# Patient Record
Sex: FEMALE | Race: WHITE | Employment: FULL TIME | URBAN - METROPOLITAN AREA
[De-identification: names, ages, dates, MRNs, and addresses within clinical notes are randomized per-mention and may not be internally consistent; named-entity substitution may affect disease eponyms.]

---

## 2017-01-09 ENCOUNTER — OFFICE VISIT (OUTPATIENT)
Dept: PERINATAL CARE | Facility: HOSPITAL | Age: 37
End: 2017-01-09
Attending: OBSTETRICS & GYNECOLOGY
Payer: COMMERCIAL

## 2017-01-09 VITALS
BODY MASS INDEX: 26.06 KG/M2 | WEIGHT: 166 LBS | HEIGHT: 67 IN | SYSTOLIC BLOOD PRESSURE: 93 MMHG | DIASTOLIC BLOOD PRESSURE: 71 MMHG | HEART RATE: 96 BPM

## 2017-01-09 DIAGNOSIS — O09.522 AMA (ADVANCED MATERNAL AGE) MULTIGRAVIDA 35+, SECOND TRIMESTER: Primary | ICD-10-CM

## 2017-01-09 PROBLEM — O09.529 AMA (ADVANCED MATERNAL AGE) MULTIGRAVIDA 35+ (HCC): Status: ACTIVE | Noted: 2017-01-09

## 2017-01-09 PROBLEM — O09.529 AMA (ADVANCED MATERNAL AGE) MULTIGRAVIDA 35+: Status: ACTIVE | Noted: 2017-01-09

## 2017-01-09 PROCEDURE — 99242 OFF/OP CONSLTJ NEW/EST SF 20: CPT

## 2017-01-09 PROCEDURE — 76811 OB US DETAILED SNGL FETUS: CPT

## 2017-01-09 RX ORDER — CHOLECALCIFEROL (VITAMIN D3) 25 MCG
1 TABLET,CHEWABLE ORAL DAILY
COMMUNITY
End: 2017-11-20

## 2017-01-09 NOTE — PROGRESS NOTES
Indication: Maternal age (39 years). ____________________________________________________________________________  History: Age: 39 years. Maternal age at [facility]: 39 years.  : 3 Para: 1.  _______________________________________________________________ No minor markers for aneuploidy are seen. The limitations of ultrasound were discussed. The patient understands that ultrasound cannot rule out all structural and chromosomal abnormalities.     ______________________________________________________________ pregnancy outcomes in women of advanced reproductive age. However, these women are at increased risk of infertility and pregnancy complications.   They are more likely to conceive with the assistance of assisted reproductive technology  and have a multiple as 28 percent in women over age 48. The incidence of gestational diabetes in the general obstetric population is 3 percent, rising to 7 to 15 percent in women over age 36 and 21 percent in women over age 48.   Women 28years of age or older are more likel anomaly is noted on ultrasound or Cell Free Fetal DNA testing is positive, amniocentesis or CVS should be recommended. Her prenatal records indicated she had a low risk Panorama screen. We reviewed cell free DNA screening and the limitations.   She is

## 2017-03-01 ENCOUNTER — OFFICE VISIT (OUTPATIENT)
Dept: FAMILY MEDICINE CLINIC | Facility: CLINIC | Age: 37
End: 2017-03-01

## 2017-03-01 VITALS
BODY MASS INDEX: 27 KG/M2 | HEART RATE: 99 BPM | RESPIRATION RATE: 16 BRPM | DIASTOLIC BLOOD PRESSURE: 72 MMHG | SYSTOLIC BLOOD PRESSURE: 116 MMHG | OXYGEN SATURATION: 98 % | TEMPERATURE: 98 F | WEIGHT: 174 LBS

## 2017-03-01 DIAGNOSIS — H11.31 SUBCONJUNCTIVAL HEMORRHAGE, RIGHT: ICD-10-CM

## 2017-03-01 DIAGNOSIS — Z13.0 SCREENING FOR ENDOCRINE, METABOLIC AND IMMUNITY DISORDER: ICD-10-CM

## 2017-03-01 DIAGNOSIS — Z13.29 SCREENING FOR ENDOCRINE, METABOLIC AND IMMUNITY DISORDER: ICD-10-CM

## 2017-03-01 DIAGNOSIS — H10.33 ACUTE BACTERIAL CONJUNCTIVITIS OF BOTH EYES: Primary | ICD-10-CM

## 2017-03-01 DIAGNOSIS — Z13.228 SCREENING FOR ENDOCRINE, METABOLIC AND IMMUNITY DISORDER: ICD-10-CM

## 2017-03-01 PROCEDURE — 99213 OFFICE O/P EST LOW 20 MIN: CPT | Performed by: PHYSICIAN ASSISTANT

## 2017-03-01 RX ORDER — TOBRAMYCIN 3 MG/ML
1 SOLUTION/ DROPS OPHTHALMIC EVERY 6 HOURS
Qty: 1 BOTTLE | Refills: 0 | Status: SHIPPED | OUTPATIENT
Start: 2017-03-01 | End: 2017-03-06

## 2017-03-06 ENCOUNTER — LAB ENCOUNTER (OUTPATIENT)
Dept: LAB | Age: 37
End: 2017-03-06
Attending: PHYSICIAN ASSISTANT
Payer: COMMERCIAL

## 2017-03-06 DIAGNOSIS — Z13.29 SCREENING FOR ENDOCRINE, METABOLIC AND IMMUNITY DISORDER: ICD-10-CM

## 2017-03-06 DIAGNOSIS — Z13.0 SCREENING FOR ENDOCRINE, METABOLIC AND IMMUNITY DISORDER: ICD-10-CM

## 2017-03-06 DIAGNOSIS — Z13.228 SCREENING FOR ENDOCRINE, METABOLIC AND IMMUNITY DISORDER: ICD-10-CM

## 2017-03-06 LAB
25-HYDROXYVITAMIN D (TOTAL): 33.6 NG/ML (ref 30–100)
ALBUMIN SERPL-MCNC: 2.6 G/DL (ref 3.5–4.8)
ALP LIVER SERPL-CCNC: 96 U/L (ref 37–98)
ALT SERPL-CCNC: 15 U/L (ref 14–54)
AST SERPL-CCNC: 17 U/L (ref 15–41)
BASOPHILS # BLD AUTO: 0.02 X10(3) UL (ref 0–0.1)
BASOPHILS NFR BLD AUTO: 0.3 %
BILIRUB SERPL-MCNC: 0.3 MG/DL (ref 0.1–2)
BUN BLD-MCNC: 8 MG/DL (ref 8–20)
CALCIUM BLD-MCNC: 8.4 MG/DL (ref 8.3–10.3)
CHLORIDE: 108 MMOL/L (ref 101–111)
CHOLEST SMN-MCNC: 282 MG/DL (ref ?–200)
CO2: 23 MMOL/L (ref 22–32)
CREAT BLD-MCNC: 0.62 MG/DL (ref 0.55–1.02)
EOSINOPHIL # BLD AUTO: 0.11 X10(3) UL (ref 0–0.3)
EOSINOPHIL NFR BLD AUTO: 1.7 %
ERYTHROCYTE [DISTWIDTH] IN BLOOD BY AUTOMATED COUNT: 12.9 % (ref 11.5–16)
GLUCOSE BLD-MCNC: 80 MG/DL (ref 70–99)
HCT VFR BLD AUTO: 38.6 % (ref 34–50)
HDLC SERPL-MCNC: 89 MG/DL (ref 45–?)
HDLC SERPL: 3.17 {RATIO} (ref ?–4.44)
HGB BLD-MCNC: 12.8 G/DL (ref 12–16)
IMMATURE GRANULOCYTE COUNT: 0.06 X10(3) UL (ref 0–1)
IMMATURE GRANULOCYTE RATIO %: 0.9 %
LDLC SERPL CALC-MCNC: 167 MG/DL (ref ?–130)
LYMPHOCYTES # BLD AUTO: 1.35 X10(3) UL (ref 0.9–4)
LYMPHOCYTES NFR BLD AUTO: 20.8 %
M PROTEIN MFR SERPL ELPH: 6.6 G/DL (ref 6.1–8.3)
MCH RBC QN AUTO: 31.6 PG (ref 27–33.2)
MCHC RBC AUTO-ENTMCNC: 33.2 G/DL (ref 31–37)
MCV RBC AUTO: 95.3 FL (ref 81–100)
MONOCYTES # BLD AUTO: 0.52 X10(3) UL (ref 0.1–0.6)
MONOCYTES NFR BLD AUTO: 8 %
NEUTROPHIL ABS PRELIM: 4.43 X10 (3) UL (ref 1.3–6.7)
NEUTROPHILS # BLD AUTO: 4.43 X10(3) UL (ref 1.3–6.7)
NEUTROPHILS NFR BLD AUTO: 68.3 %
NONHDLC SERPL-MCNC: 193 MG/DL (ref ?–130)
PLATELET # BLD AUTO: 281 10(3)UL (ref 150–450)
POTASSIUM SERPL-SCNC: 3.9 MMOL/L (ref 3.6–5.1)
RBC # BLD AUTO: 4.05 X10(6)UL (ref 3.8–5.1)
RED CELL DISTRIBUTION WIDTH-SD: 44.6 FL (ref 35.1–46.3)
SODIUM SERPL-SCNC: 141 MMOL/L (ref 136–144)
TRIGLYCERIDES: 131 MG/DL (ref ?–150)
TSI SER-ACNC: 2 MIU/ML (ref 0.35–5.5)
VLDL: 26 MG/DL (ref 5–40)
WBC # BLD AUTO: 6.5 X10(3) UL (ref 4–13)

## 2017-03-06 PROCEDURE — 85025 COMPLETE CBC W/AUTO DIFF WBC: CPT

## 2017-03-06 PROCEDURE — 36415 COLL VENOUS BLD VENIPUNCTURE: CPT

## 2017-03-06 PROCEDURE — 80053 COMPREHEN METABOLIC PANEL: CPT

## 2017-03-06 PROCEDURE — 84443 ASSAY THYROID STIM HORMONE: CPT

## 2017-03-06 PROCEDURE — 82306 VITAMIN D 25 HYDROXY: CPT

## 2017-03-06 PROCEDURE — 80061 LIPID PANEL: CPT

## 2017-03-07 ENCOUNTER — TELEPHONE (OUTPATIENT)
Dept: FAMILY MEDICINE CLINIC | Facility: CLINIC | Age: 37
End: 2017-03-07

## 2017-03-07 NOTE — TELEPHONE ENCOUNTER
NCR message:  \"Notes Recorded by Saurav Ferrari PA-C on 3/7/2017 at 8:41 AM  Your cholesterol is elevated. We would not start medication because you are currently pregnant so work on a low fat diet and we will recheck labs after pregnancy. \"

## 2017-03-07 NOTE — TELEPHONE ENCOUNTER
----- Message from Zeina Miller PA-C sent at 3/7/2017  8:41 AM CST -----  Cholesterol is elevated-patient is currently pregnant and can not take rx medication-low fat diet-recheck after pregnancy.

## 2017-05-29 ENCOUNTER — HOSPITAL ENCOUNTER (INPATIENT)
Facility: HOSPITAL | Age: 37
LOS: 2 days | Discharge: HOME | End: 2017-05-31
Attending: OBSTETRICS & GYNECOLOGY | Admitting: OBSTETRICS & GYNECOLOGY
Payer: COMMERCIAL

## 2017-05-29 ENCOUNTER — SURGERY (OUTPATIENT)
Age: 37
End: 2017-05-29

## 2017-05-29 PROCEDURE — 86850 RBC ANTIBODY SCREEN: CPT | Performed by: OBSTETRICS & GYNECOLOGY

## 2017-05-29 PROCEDURE — 86901 BLOOD TYPING SEROLOGIC RH(D): CPT | Performed by: OBSTETRICS & GYNECOLOGY

## 2017-05-29 PROCEDURE — 86780 TREPONEMA PALLIDUM: CPT | Performed by: OBSTETRICS & GYNECOLOGY

## 2017-05-29 PROCEDURE — 85025 COMPLETE CBC W/AUTO DIFF WBC: CPT | Performed by: OBSTETRICS & GYNECOLOGY

## 2017-05-29 PROCEDURE — 86870 RBC ANTIBODY IDENTIFICATION: CPT | Performed by: OBSTETRICS & GYNECOLOGY

## 2017-05-29 PROCEDURE — 86900 BLOOD TYPING SEROLOGIC ABO: CPT | Performed by: OBSTETRICS & GYNECOLOGY

## 2017-05-29 RX ORDER — SODIUM CHLORIDE, SODIUM LACTATE, POTASSIUM CHLORIDE, CALCIUM CHLORIDE 600; 310; 30; 20 MG/100ML; MG/100ML; MG/100ML; MG/100ML
INJECTION, SOLUTION INTRAVENOUS CONTINUOUS
Status: DISCONTINUED | OUTPATIENT
Start: 2017-05-29 | End: 2017-05-31

## 2017-05-29 RX ORDER — ZOLPIDEM TARTRATE 5 MG/1
5 TABLET ORAL NIGHTLY PRN
Status: DISCONTINUED | OUTPATIENT
Start: 2017-05-29 | End: 2017-05-31

## 2017-05-29 RX ORDER — NALOXONE HYDROCHLORIDE 0.4 MG/ML
0.08 INJECTION, SOLUTION INTRAMUSCULAR; INTRAVENOUS; SUBCUTANEOUS
Status: ACTIVE | OUTPATIENT
Start: 2017-05-29 | End: 2017-05-30

## 2017-05-29 RX ORDER — METOCLOPRAMIDE HYDROCHLORIDE 5 MG/ML
10 INJECTION INTRAMUSCULAR; INTRAVENOUS EVERY 6 HOURS PRN
Status: DISCONTINUED | OUTPATIENT
Start: 2017-05-29 | End: 2017-05-31

## 2017-05-29 RX ORDER — ONDANSETRON 2 MG/ML
4 INJECTION INTRAMUSCULAR; INTRAVENOUS ONCE AS NEEDED
Status: COMPLETED | OUTPATIENT
Start: 2017-05-29 | End: 2017-05-29

## 2017-05-29 RX ORDER — ONDANSETRON 2 MG/ML
INJECTION INTRAMUSCULAR; INTRAVENOUS
Status: COMPLETED
Start: 2017-05-29 | End: 2017-05-29

## 2017-05-29 RX ORDER — IBUPROFEN 600 MG/1
600 TABLET ORAL EVERY 6 HOURS SCHEDULED
Status: DISCONTINUED | OUTPATIENT
Start: 2017-05-30 | End: 2017-05-31

## 2017-05-29 RX ORDER — DIPHENHYDRAMINE HYDROCHLORIDE 50 MG/ML
12.5 INJECTION INTRAMUSCULAR; INTRAVENOUS EVERY 4 HOURS PRN
Status: DISCONTINUED | OUTPATIENT
Start: 2017-05-29 | End: 2017-05-31

## 2017-05-29 RX ORDER — ONDANSETRON 2 MG/ML
4 INJECTION INTRAMUSCULAR; INTRAVENOUS EVERY 6 HOURS PRN
Status: DISCONTINUED | OUTPATIENT
Start: 2017-05-29 | End: 2017-05-31

## 2017-05-29 RX ORDER — SODIUM CHLORIDE, SODIUM LACTATE, POTASSIUM CHLORIDE, CALCIUM CHLORIDE 600; 310; 30; 20 MG/100ML; MG/100ML; MG/100ML; MG/100ML
INJECTION, SOLUTION INTRAVENOUS CONTINUOUS
Status: DISCONTINUED | OUTPATIENT
Start: 2017-05-29 | End: 2017-05-29

## 2017-05-29 RX ORDER — MISOPROSTOL 200 UG/1
600 TABLET ORAL ONCE AS NEEDED
Status: ACTIVE | OUTPATIENT
Start: 2017-05-29 | End: 2017-05-29

## 2017-05-29 RX ORDER — DOCUSATE SODIUM 100 MG/1
100 CAPSULE, LIQUID FILLED ORAL
Status: DISCONTINUED | OUTPATIENT
Start: 2017-05-30 | End: 2017-05-31

## 2017-05-29 RX ORDER — CEFAZOLIN SODIUM 1 G/3ML
INJECTION, POWDER, FOR SOLUTION INTRAMUSCULAR; INTRAVENOUS
Status: DISCONTINUED | OUTPATIENT
Start: 2017-05-29 | End: 2017-05-29

## 2017-05-29 RX ORDER — HYDROMORPHONE HYDROCHLORIDE 1 MG/ML
0.4 INJECTION, SOLUTION INTRAMUSCULAR; INTRAVENOUS; SUBCUTANEOUS EVERY 5 MIN PRN
Status: DISCONTINUED | OUTPATIENT
Start: 2017-05-29 | End: 2017-05-29 | Stop reason: HOSPADM

## 2017-05-29 RX ORDER — NALBUPHINE HCL 10 MG/ML
2.5 AMPUL (ML) INJECTION
Status: DISCONTINUED | OUTPATIENT
Start: 2017-05-29 | End: 2017-05-29 | Stop reason: HOSPADM

## 2017-05-29 RX ORDER — HYDROMORPHONE HYDROCHLORIDE 1 MG/ML
0.4 INJECTION, SOLUTION INTRAMUSCULAR; INTRAVENOUS; SUBCUTANEOUS EVERY 2 HOUR PRN
Status: ACTIVE | OUTPATIENT
Start: 2017-05-29 | End: 2017-05-30

## 2017-05-29 RX ORDER — DIPHENHYDRAMINE HCL 25 MG
25 CAPSULE ORAL EVERY 4 HOURS PRN
Status: DISCONTINUED | OUTPATIENT
Start: 2017-05-29 | End: 2017-05-31

## 2017-05-29 RX ORDER — KETOROLAC TROMETHAMINE 30 MG/ML
30 INJECTION, SOLUTION INTRAMUSCULAR; INTRAVENOUS ONCE AS NEEDED
Status: COMPLETED | OUTPATIENT
Start: 2017-05-29 | End: 2017-05-29

## 2017-05-29 RX ORDER — NALBUPHINE HCL 10 MG/ML
2.5 AMPUL (ML) INJECTION EVERY 4 HOURS PRN
Status: DISCONTINUED | OUTPATIENT
Start: 2017-05-29 | End: 2017-05-31

## 2017-05-29 RX ORDER — BISACODYL 10 MG
10 SUPPOSITORY, RECTAL RECTAL
Status: DISCONTINUED | OUTPATIENT
Start: 2017-05-29 | End: 2017-05-31

## 2017-05-29 RX ORDER — DEXTROSE, SODIUM CHLORIDE, SODIUM LACTATE, POTASSIUM CHLORIDE, AND CALCIUM CHLORIDE 5; .6; .31; .03; .02 G/100ML; G/100ML; G/100ML; G/100ML; G/100ML
INJECTION, SOLUTION INTRAVENOUS CONTINUOUS
Status: DISCONTINUED | OUTPATIENT
Start: 2017-05-29 | End: 2017-05-31

## 2017-05-29 RX ORDER — KETOROLAC TROMETHAMINE 30 MG/ML
30 INJECTION, SOLUTION INTRAMUSCULAR; INTRAVENOUS EVERY 6 HOURS PRN
Status: DISPENSED | OUTPATIENT
Start: 2017-05-29 | End: 2017-05-30

## 2017-05-29 RX ORDER — HYDROCODONE BITARTRATE AND ACETAMINOPHEN 5; 325 MG/1; MG/1
1 TABLET ORAL EVERY 4 HOURS PRN
Status: DISCONTINUED | OUTPATIENT
Start: 2017-05-29 | End: 2017-05-31

## 2017-05-29 RX ORDER — SIMETHICONE 80 MG
80 TABLET,CHEWABLE ORAL DAILY PRN
Status: DISCONTINUED | OUTPATIENT
Start: 2017-05-29 | End: 2017-05-31

## 2017-05-29 RX ORDER — DIPHENHYDRAMINE HYDROCHLORIDE 50 MG/ML
25 INJECTION INTRAMUSCULAR; INTRAVENOUS ONCE AS NEEDED
Status: DISCONTINUED | OUTPATIENT
Start: 2017-05-29 | End: 2017-05-29 | Stop reason: HOSPADM

## 2017-05-29 RX ORDER — HYDROCODONE BITARTRATE AND ACETAMINOPHEN 10; 325 MG/1; MG/1
1 TABLET ORAL EVERY 4 HOURS PRN
Status: DISCONTINUED | OUTPATIENT
Start: 2017-05-29 | End: 2017-05-31

## 2017-05-29 NOTE — PROGRESS NOTES
Up to bathroom with assistance from [facility]. OBT, pericare done, back to sit in chair.

## 2017-05-29 NOTE — H&P
Oscar Conway 89 Patient Status:  Inpatient    1980 MRN [id number]   [facility] 1NW-A Attending Jung Linda MD   Hosp Day # 0 PCP Anjelica Peterson MD     Subjective: Delayed Entry  Donald Faremr detail. All questions answered, all appropriate consents will be signed at the Hospital. Admission is planned for today.    Intervention: plan  delivery

## 2017-05-29 NOTE — OPERATIVE REPORT
[facility]   Section - Operative Note    Radha Kaylaair Thurston Patient Status:  Inpatient    1980 MRN [id number]   [facility] 1NW-A Attending Cathy Pearson MD   Hosp Day # 0 PCP Sofiya Márquez MD   Preoperative Torri intact with a 3 vessel cord. Uterus, tubes and ovaries were normal in appearance. The uterine cavity was swept clean using a wet lap. The first layer of the hysterotomy was closed using 0 Vicryl. A second imbricating layer was placed with 0 Vicryl.   Delmy Rubin Operative Delivery    No data filed          Shoulder Dystocia    No data filed          Anesthesia    Method:  Spinal             [place] Delivery     Changing the 's delivery date/time could affect patient care.:     Delivery date/time:   17 disposition:  with mother          Skin to Skin    No data filed          Vaginal Count    No data filed

## 2017-05-29 NOTE — PROGRESS NOTES
Pt is a 39year old female admitted to triage 2. Patient presents with:  R/o Labor: leaking clear fluid and contractions Q5min since midnight     Pt is  40w6d intra-uterine pregnancy. History obtained, consents signed.  Oriented to room, staff, an

## 2017-05-29 NOTE — PROGRESS NOTES
[facility]  Post-Partum Caesarean Section Progress Note    Abbey Samuel Patient Status:  Inpatient    1980 MRN [id number]   [facility] 1SW-J Attending Gato Arrieta MD   Hosp Day # 0 PCP Marcella Newell MD     SUBJ

## 2017-05-29 NOTE — PROGRESS NOTES
Patient admitted to mother baby unit in stable condition. Hugs and kisses applied and ID bands checked with L&D Nurse. Patient educated on use of call light, bed, room lights, tv, and phone. Patient aware to call for assistance as needed when getting up.

## 2017-05-30 PROCEDURE — 85025 COMPLETE CBC W/AUTO DIFF WBC: CPT | Performed by: OBSTETRICS & GYNECOLOGY

## 2017-05-30 NOTE — PLAN OF CARE
GASTROINTESTINAL - ADULT    • Minimal or absence of nausea and vomiting Completed    • Maintains or returns to baseline bowel function Completed    • Maintains adequate nutritional intake (undernourished) Completed    • Achieves appropriate nutritional int

## 2017-05-31 VITALS
WEIGHT: 182 LBS | HEIGHT: 67 IN | RESPIRATION RATE: 20 BRPM | BODY MASS INDEX: 28.56 KG/M2 | SYSTOLIC BLOOD PRESSURE: 111 MMHG | TEMPERATURE: 98 F | DIASTOLIC BLOOD PRESSURE: 70 MMHG | HEART RATE: 74 BPM | OXYGEN SATURATION: 98 %

## 2017-05-31 NOTE — PLAN OF CARE
BREAST FEEDING    • Optimize infant feeding at the breast Progressing        POSTPARTUM    • Long Term Goal:Experiences normal postpartum course Progressing    • Optimize infant feeding at the breast Progressing    • Appropriate maternal -  bonding

## 2017-05-31 NOTE — PROGRESS NOTES
[facility]  Post-Partum Caesarean Section Progress Note    Samuel Nelson Patient Status:  Inpatient    1980 MRN [id number]   [facility] 1SW-J Attending Scar Canada MD   Hosp Day # 2 PCP Vee Dos Santos MD     SUBJ

## 2017-05-31 NOTE — PLAN OF CARE
Problem: SAFETY ADULT - FALL  Goal: Free from fall injury  INTERVENTIONS:  - Assess pt frequently for physical needs  - Identify cognitive and physical deficits and behaviors that affect risk of falls.   - Marion fall precautions as indicated by assessme previous experience with breast feeding.  - Provide information as needed about early infant feeding cues (e.g., rooting, lip smacking, sucking fingers/hand) versus late cue of crying.  - Discuss/demonstrate breast feeding aids (e.g., infant sling, nursing experience with breast feeding.  - Provide information as needed about early infant feeding cues (e.g., rooting, lip smacking, sucking fingers/hand) versus late cue of crying.  - Discuss/demonstrate breast feeding aids (e.g., infant sling, nursing footstoo

## 2017-05-31 NOTE — PROGRESS NOTES
Discharge patient home as order. Teaching complete, patient feel comfortable to take care herself and  infant. Hugs and kisses off. Sent both mom and infant to their family car @ 200.

## 2017-06-02 ENCOUNTER — TELEPHONE (OUTPATIENT)
Dept: OBGYN UNIT | Facility: HOSPITAL | Age: 37
End: 2017-06-02

## 2017-06-06 NOTE — DISCHARGE SUMMARY
[facility]  Discharge Summary    Juan Thurston Patient Status:  Inpatient    1980 MRN [id number]   [facility] 1SW-J Attending No att. providers found   Hosp Day # 2 PCP Serena Garcia MD         [facility]: Estimated Date of Deli

## 2017-11-20 ENCOUNTER — OFFICE VISIT (OUTPATIENT)
Dept: FAMILY MEDICINE CLINIC | Facility: CLINIC | Age: 37
End: 2017-11-20

## 2017-11-20 ENCOUNTER — HOSPITAL ENCOUNTER (OUTPATIENT)
Dept: GENERAL RADIOLOGY | Age: 37
Discharge: HOME | End: 2017-11-20
Attending: FAMILY MEDICINE
Payer: COMMERCIAL

## 2017-11-20 VITALS
BODY MASS INDEX: 24.33 KG/M2 | WEIGHT: 155 LBS | TEMPERATURE: 98 F | HEIGHT: 67 IN | HEART RATE: 76 BPM | SYSTOLIC BLOOD PRESSURE: 102 MMHG | DIASTOLIC BLOOD PRESSURE: 76 MMHG | RESPIRATION RATE: 16 BRPM

## 2017-11-20 DIAGNOSIS — M79.622 LEFT UPPER ARM PAIN: ICD-10-CM

## 2017-11-20 DIAGNOSIS — M79.622 LEFT UPPER ARM PAIN: Primary | ICD-10-CM

## 2017-11-20 DIAGNOSIS — Z23 NEEDS FLU SHOT: ICD-10-CM

## 2017-11-20 DIAGNOSIS — M25.512 ACUTE PAIN OF LEFT SHOULDER: ICD-10-CM

## 2017-11-20 DIAGNOSIS — L70.9 ACNE, UNSPECIFIED ACNE TYPE: ICD-10-CM

## 2017-11-20 PROCEDURE — 90471 IMMUNIZATION ADMIN: CPT | Performed by: FAMILY MEDICINE

## 2017-11-20 PROCEDURE — 99214 OFFICE O/P EST MOD 30 MIN: CPT | Performed by: FAMILY MEDICINE

## 2017-11-20 PROCEDURE — 73030 X-RAY EXAM OF SHOULDER: CPT | Performed by: FAMILY MEDICINE

## 2017-11-20 PROCEDURE — 90686 IIV4 VACC NO PRSV 0.5 ML IM: CPT | Performed by: FAMILY MEDICINE

## 2017-11-20 RX ORDER — ERYTHROMYCIN 20 MG/ML
1 SOLUTION TOPICAL DAILY
COMMUNITY
End: 2017-11-20

## 2017-11-20 RX ORDER — ERYTHROMYCIN 20 MG/ML
1 SOLUTION TOPICAL DAILY
Qty: 1 BOTTLE | Refills: 0 | Status: SHIPPED | OUTPATIENT
Start: 2017-11-20 | End: 2018-11-13

## 2017-11-20 RX ORDER — CEPHALEXIN 500 MG/1
500 CAPSULE ORAL 3 TIMES DAILY
Qty: 90 CAPSULE | Refills: 0 | Status: SHIPPED | OUTPATIENT
Start: 2017-11-20 | End: 2018-03-12

## 2017-11-20 RX ORDER — NORETHINDRONE ACETATE AND ETHINYL ESTRADIOL AND FERROUS FUMARATE 1MG-20(21)
KIT ORAL
Refills: 5 | COMMUNITY
Start: 2017-11-10 | End: 2018-11-13

## 2017-11-20 RX ORDER — CEPHALEXIN 500 MG/1
500 CAPSULE ORAL 3 TIMES DAILY
COMMUNITY
End: 2017-11-20

## 2017-11-20 NOTE — PROGRESS NOTES
Chief Complaint:   Patient presents with:  Musculoskeletal Problem: left arm pain symptoms started about 3 months ago after pulling infant car seat     HPI:   This is a 39year old female presenting for left upper arm pain worse with overuse recently had a Negative for photophobia, pain, discharge, redness, itching and visual disturbance. Respiratory: Negative for cough, chest tightness and shortness of breath. Cardiovascular: Negative for chest pain, palpitations and leg swelling.    Gastrointestinal: N No cerumen present  Nose: Nose normal.   Mouth/Throat: Oropharynx is clear and moist.   Eyes: Conjunctivae are normal. Pupils are equal, round, and reactive to light. Right eye exhibits no discharge. Left eye exhibits no discharge. No scleral icterus.    Ne PRESERVATIVE FREE 0.5 ML    4. Acne, unspecified acne type  -refill on medication, asymptomatic.   - Erythromycin 2 % External Solution; Apply 1 mL topically daily. Dispense: 1 Bottle; Refill: 0  - cephALEXin 500 MG Oral Cap;  Take 1 capsule (500 mg total)

## 2017-12-12 ENCOUNTER — HOSPITAL ENCOUNTER (OUTPATIENT)
Dept: GENERAL RADIOLOGY | Facility: HOSPITAL | Age: 37
Discharge: HOME | End: 2017-12-12
Attending: OBSTETRICS & GYNECOLOGY
Payer: COMMERCIAL

## 2017-12-12 ENCOUNTER — APPOINTMENT (OUTPATIENT)
Dept: LAB | Facility: HOSPITAL | Age: 37
End: 2017-12-12
Attending: OBSTETRICS & GYNECOLOGY
Payer: COMMERCIAL

## 2017-12-12 DIAGNOSIS — Z98.51 STATUS POST TUBAL LIGATION: ICD-10-CM

## 2017-12-12 DIAGNOSIS — Z09 FOLLOW-UP EXAMINATION: ICD-10-CM

## 2017-12-12 PROCEDURE — 58340 CATHETER FOR HYSTEROGRAPHY: CPT | Performed by: OBSTETRICS & GYNECOLOGY

## 2017-12-12 PROCEDURE — 74740 X-RAY FEMALE GENITAL TRACT: CPT | Performed by: OBSTETRICS & GYNECOLOGY

## 2018-03-12 DIAGNOSIS — L70.9 ACNE, UNSPECIFIED ACNE TYPE: ICD-10-CM

## 2018-03-12 RX ORDER — CEPHALEXIN 500 MG/1
CAPSULE ORAL
Qty: 90 CAPSULE | Refills: 0 | Status: SHIPPED | OUTPATIENT
Start: 2018-03-12 | End: 2018-11-13

## 2018-06-15 NOTE — PROGRESS NOTES
Patient transferred to mother/baby room 1110 per cart in stable condition with baby and personal belongings. Accompanied by  and staff. Report given to mother/baby RN. ER physician

## 2018-11-13 ENCOUNTER — OFFICE VISIT (OUTPATIENT)
Dept: FAMILY MEDICINE CLINIC | Facility: CLINIC | Age: 38
End: 2018-11-13

## 2018-11-13 VITALS
WEIGHT: 151 LBS | HEART RATE: 78 BPM | BODY MASS INDEX: 23.7 KG/M2 | HEIGHT: 67 IN | RESPIRATION RATE: 16 BRPM | SYSTOLIC BLOOD PRESSURE: 102 MMHG | DIASTOLIC BLOOD PRESSURE: 70 MMHG | TEMPERATURE: 98 F

## 2018-11-13 DIAGNOSIS — Z23 NEED FOR VACCINATION: ICD-10-CM

## 2018-11-13 DIAGNOSIS — Z13.0 SCREENING FOR ENDOCRINE, NUTRITIONAL, METABOLIC AND IMMUNITY DISORDER: ICD-10-CM

## 2018-11-13 DIAGNOSIS — Z13.228 SCREENING FOR ENDOCRINE, NUTRITIONAL, METABOLIC AND IMMUNITY DISORDER: ICD-10-CM

## 2018-11-13 DIAGNOSIS — Z13.29 SCREENING FOR ENDOCRINE, NUTRITIONAL, METABOLIC AND IMMUNITY DISORDER: ICD-10-CM

## 2018-11-13 DIAGNOSIS — Z00.00 ANNUAL PHYSICAL EXAM: Primary | ICD-10-CM

## 2018-11-13 DIAGNOSIS — Z13.21 SCREENING FOR ENDOCRINE, NUTRITIONAL, METABOLIC AND IMMUNITY DISORDER: ICD-10-CM

## 2018-11-13 PROCEDURE — 99395 PREV VISIT EST AGE 18-39: CPT | Performed by: FAMILY MEDICINE

## 2018-11-13 PROCEDURE — 90471 IMMUNIZATION ADMIN: CPT | Performed by: FAMILY MEDICINE

## 2018-11-13 PROCEDURE — 90686 IIV4 VACC NO PRSV 0.5 ML IM: CPT | Performed by: FAMILY MEDICINE

## 2018-11-14 ENCOUNTER — MED REC SCAN ONLY (OUTPATIENT)
Dept: FAMILY MEDICINE CLINIC | Facility: CLINIC | Age: 38
End: 2018-11-14

## 2018-11-14 NOTE — PROGRESS NOTES
Chief Complaint:   Patient presents with:  Physical    HPI:   This is a 40year old female presenting for annual physical no new new complaints at this time. Patient denies shortness of breath, denies chest pain and denies any recent fevers or chills. Negative for dysuria, hematuria, flank pain and difficulty urinating. Musculoskeletal: Negative for joint pain, gait problem, neck pain and neck stiffness. Skin: Negative for color change, pallor, rash and wound.    Allergic/Immunologic: Negative for en present. Pulmonary/Chest: Effort normal and breath sounds normal. No stridor. No respiratory distress. She has no wheezes. She has no rales. She exhibits no tenderness. Abdominal: Soft. Bowel sounds are normal. She exhibits no distension and no mass.  Belen Taylor

## 2018-12-11 PROBLEM — Z98.890 HISTORY OF COLPOSCOPY: Status: ACTIVE | Noted: 2018-12-11

## 2018-12-11 PROBLEM — Z30.2 ENCOUNTER FOR ESSURE IMPLANTATION: Status: ACTIVE | Noted: 2018-12-11

## 2018-12-15 PROBLEM — O09.529 AMA (ADVANCED MATERNAL AGE) MULTIGRAVIDA 35+ (HCC): Status: RESOLVED | Noted: 2017-01-09 | Resolved: 2018-12-15

## 2018-12-15 PROBLEM — O09.529 AMA (ADVANCED MATERNAL AGE) MULTIGRAVIDA 35+: Status: RESOLVED | Noted: 2017-01-09 | Resolved: 2018-12-15

## 2019-02-06 ENCOUNTER — OFFICE VISIT (OUTPATIENT)
Dept: FAMILY MEDICINE CLINIC | Facility: CLINIC | Age: 39
End: 2019-02-06

## 2019-02-06 VITALS
HEIGHT: 67 IN | RESPIRATION RATE: 16 BRPM | BODY MASS INDEX: 24.17 KG/M2 | OXYGEN SATURATION: 98 % | SYSTOLIC BLOOD PRESSURE: 104 MMHG | WEIGHT: 154 LBS | DIASTOLIC BLOOD PRESSURE: 70 MMHG | HEART RATE: 94 BPM | TEMPERATURE: 98 F

## 2019-02-06 DIAGNOSIS — J01.10 ACUTE NON-RECURRENT FRONTAL SINUSITIS: Primary | ICD-10-CM

## 2019-02-06 PROCEDURE — 99213 OFFICE O/P EST LOW 20 MIN: CPT | Performed by: PHYSICIAN ASSISTANT

## 2019-02-06 RX ORDER — AMOXICILLIN AND CLAVULANATE POTASSIUM 875; 125 MG/1; MG/1
1 TABLET, FILM COATED ORAL 2 TIMES DAILY
Qty: 20 TABLET | Refills: 0 | Status: SHIPPED | OUTPATIENT
Start: 2019-02-06 | End: 2019-02-16

## 2019-02-06 NOTE — PATIENT INSTRUCTIONS
Flonase   Ibuprofen OTC as needed   Rest   Fluids   Please follow up with PCP if no improvement or if symptoms worsen

## 2019-02-06 NOTE — PROGRESS NOTES
CHIEF COMPLAINT:   Patient presents with:  Nasal Congestion: x 3 weeks, chills, body aches, drainage      HPI:   Silver Crook is a 45year old female who presents for cold symptoms for 3 weeks. +facial pressure. Head feels full.  Post nasal drip into the lesions  HEAD: atraumatic, normocephalic, + tenderness on palpation of frontal sinuses  EYES: conjunctiva clear  EARS: TM's pearly grey, no bulging, no retraction, no fluid, bony landmarks present   NOSE: nostrils patent, + nasal mucous, nasal mucosa redde

## 2019-04-13 ENCOUNTER — OFFICE VISIT (OUTPATIENT)
Dept: FAMILY MEDICINE CLINIC | Facility: CLINIC | Age: 39
End: 2019-04-13

## 2019-04-13 VITALS
TEMPERATURE: 98 F | RESPIRATION RATE: 16 BRPM | HEIGHT: 67 IN | SYSTOLIC BLOOD PRESSURE: 102 MMHG | DIASTOLIC BLOOD PRESSURE: 70 MMHG | BODY MASS INDEX: 23.54 KG/M2 | HEART RATE: 88 BPM | OXYGEN SATURATION: 98 % | WEIGHT: 150 LBS

## 2019-04-13 DIAGNOSIS — J01.11 ACUTE RECURRENT FRONTAL SINUSITIS: Primary | ICD-10-CM

## 2019-04-13 PROCEDURE — 99213 OFFICE O/P EST LOW 20 MIN: CPT | Performed by: NURSE PRACTITIONER

## 2019-04-13 RX ORDER — AMOXICILLIN AND CLAVULANATE POTASSIUM 875; 125 MG/1; MG/1
1 TABLET, FILM COATED ORAL 2 TIMES DAILY
Qty: 14 TABLET | Refills: 0 | Status: SHIPPED | OUTPATIENT
Start: 2019-04-13 | End: 2019-04-20

## 2019-04-13 RX ORDER — MONTELUKAST SODIUM 10 MG/1
10 TABLET ORAL DAILY
Qty: 30 TABLET | Refills: 2 | Status: SHIPPED | OUTPATIENT
Start: 2019-04-13 | End: 2019-07-16

## 2019-04-13 RX ORDER — METHYLPREDNISOLONE 4 MG/1
TABLET ORAL
Qty: 1 KIT | Refills: 0 | Status: SHIPPED | OUTPATIENT
Start: 2019-04-13 | End: 2019-11-22

## 2019-04-13 NOTE — PROGRESS NOTES
Aline King is a 45year old female. Patient presents with:  Sinus Problem: pressure, runny nose, drainage, congestion, fluid in ear x3days     HPI:    Patient is 45 y female presents with sinus congestion.  Reports symptoms started   2 weeks   ago w Amoxicillin-Pot Clavulanate 875-125 MG Oral Tab; Take 1 tablet by mouth 2 (two) times daily for 7 days.  -     Montelukast Sodium (SINGULAIR) 10 MG Oral Tab; Take 1 tablet (10 mg total) by mouth daily.       Increase fluids, rest, Tylenol or Ibuprofen prn,

## 2019-07-15 DIAGNOSIS — J01.11 ACUTE RECURRENT FRONTAL SINUSITIS: ICD-10-CM

## 2019-07-15 NOTE — TELEPHONE ENCOUNTER
Medication(s) to Refill:   Requested Prescriptions     Pending Prescriptions Disp Refills   • Montelukast Sodium (SINGULAIR) 10 MG Oral Tab 30 tablet 2     Sig: Take 1 tablet (10 mg total) by mouth daily.          Reason for Medication Refill being sent to

## 2019-07-16 RX ORDER — MONTELUKAST SODIUM 10 MG/1
10 TABLET ORAL DAILY
Qty: 30 TABLET | Refills: 2 | Status: SHIPPED | OUTPATIENT
Start: 2019-07-16 | End: 2019-08-15

## 2019-11-22 ENCOUNTER — OFFICE VISIT (OUTPATIENT)
Dept: FAMILY MEDICINE CLINIC | Facility: CLINIC | Age: 39
End: 2019-11-22

## 2019-11-22 VITALS
HEIGHT: 67 IN | RESPIRATION RATE: 16 BRPM | DIASTOLIC BLOOD PRESSURE: 70 MMHG | WEIGHT: 148 LBS | SYSTOLIC BLOOD PRESSURE: 118 MMHG | HEART RATE: 70 BPM | BODY MASS INDEX: 23.23 KG/M2 | TEMPERATURE: 98 F

## 2019-11-22 DIAGNOSIS — Z23 NEED FOR VACCINATION: ICD-10-CM

## 2019-11-22 DIAGNOSIS — Z00.00 LABORATORY EXAMINATION ORDERED AS PART OF A ROUTINE GENERAL MEDICAL EXAMINATION: ICD-10-CM

## 2019-11-22 DIAGNOSIS — J34.9 SINUS DISORDER: ICD-10-CM

## 2019-11-22 DIAGNOSIS — Z00.00 ANNUAL PHYSICAL EXAM: Primary | ICD-10-CM

## 2019-11-22 PROCEDURE — 90471 IMMUNIZATION ADMIN: CPT | Performed by: FAMILY MEDICINE

## 2019-11-22 PROCEDURE — 99395 PREV VISIT EST AGE 18-39: CPT | Performed by: FAMILY MEDICINE

## 2019-11-22 PROCEDURE — 90686 IIV4 VACC NO PRSV 0.5 ML IM: CPT | Performed by: FAMILY MEDICINE

## 2019-11-22 RX ORDER — MONTELUKAST SODIUM 10 MG/1
10 TABLET ORAL DAILY
Qty: 30 TABLET | Refills: 3 | Status: SHIPPED | OUTPATIENT
Start: 2019-11-22 | End: 2019-12-22

## 2019-11-22 RX ORDER — AZITHROMYCIN 250 MG/1
TABLET, FILM COATED ORAL
Qty: 6 TABLET | Refills: 0 | Status: SHIPPED | OUTPATIENT
Start: 2019-11-22 | End: 2020-06-16 | Stop reason: CLARIF

## 2019-11-23 NOTE — PROGRESS NOTES
Chief Complaint:   Patient presents with:  Physical    HPI:   This is a 45year old female presenting for annual physical no new new complaints at this time. Cholesterol's a bit high this year, will discuss diet control.    Patient denies shortness of jordana fatigue and fever. HENT: Negative for congestion, ear pain, facial swelling, postnasal drip, rhinorrhea, sinus pressure, sore throat and voice change. Eyes: Negative for photophobia, pain, discharge, redness, itching and visual disturbance.    Respirat ear canal normal. Tympanic membrane is not erythematous. No cerumen present  Nose: Nose normal.   Mouth/Throat: Oropharynx is clear and moist.   Eyes: Pupils are equal, round, and reactive to light. Conjunctivae are normal. Right eye exhibits no discharge. INFLUENZA VACCINE QUAD PRESERVATIVE FREE 0.5 ML    Follow up in 12 months. 4. Sinus disorder  -allergy control and oral abx if no improvement in 1-2 weeks. - Montelukast Sodium (SINGULAIR) 10 MG Oral Tab; Take 1 tablet (10 mg total) by mouth daily.

## 2019-12-31 PROBLEM — Z30.2 ENCOUNTER FOR ESSURE IMPLANTATION: Status: RESOLVED | Noted: 2018-12-11 | Resolved: 2019-12-31

## 2019-12-31 PROBLEM — N92.0 MENORRHAGIA WITH REGULAR CYCLE: Status: ACTIVE | Noted: 2019-12-31

## 2020-03-20 ENCOUNTER — OFFICE VISIT (OUTPATIENT)
Dept: FAMILY MEDICINE CLINIC | Facility: CLINIC | Age: 40
End: 2020-03-20
Payer: COMMERCIAL

## 2020-03-20 ENCOUNTER — TELEPHONE (OUTPATIENT)
Dept: FAMILY MEDICINE CLINIC | Facility: CLINIC | Age: 40
End: 2020-03-20

## 2020-03-20 DIAGNOSIS — J01.40 ACUTE PANSINUSITIS, RECURRENCE NOT SPECIFIED: Primary | ICD-10-CM

## 2020-03-20 DIAGNOSIS — Z02.9 ENCOUNTERS FOR ADMINISTRATIVE PURPOSE: Primary | ICD-10-CM

## 2020-03-20 PROCEDURE — 99441 PHONE E/M BY PHYS 5-10 MIN: CPT | Performed by: PHYSICIAN ASSISTANT

## 2020-03-20 RX ORDER — FLUTICASONE PROPIONATE 50 MCG
2 SPRAY, SUSPENSION (ML) NASAL DAILY
Qty: 1 BOTTLE | Refills: 0 | Status: SHIPPED | OUTPATIENT
Start: 2020-03-20 | End: 2020-04-03

## 2020-03-20 RX ORDER — AMOXICILLIN AND CLAVULANATE POTASSIUM 875; 125 MG/1; MG/1
1 TABLET, FILM COATED ORAL 2 TIMES DAILY
Qty: 20 TABLET | Refills: 0 | Status: SHIPPED | OUTPATIENT
Start: 2020-03-20 | End: 2020-03-30

## 2020-03-20 NOTE — TELEPHONE ENCOUNTER
Virtual/Telephone Check-In    Johnnie Perez verbally consents to a Virtual/Telephone Check-In service on 03/20/20. Patient understands and accepts financial responsibility for any deductible, co-insurance and/or co-pays associated with this service.     D

## 2020-09-11 ENCOUNTER — IMMUNIZATION (OUTPATIENT)
Dept: FAMILY MEDICINE CLINIC | Facility: CLINIC | Age: 40
End: 2020-09-11
Payer: COMMERCIAL

## 2020-09-11 ENCOUNTER — LAB ENCOUNTER (OUTPATIENT)
Dept: LAB | Age: 40
End: 2020-09-11
Attending: FAMILY MEDICINE
Payer: COMMERCIAL

## 2020-09-11 DIAGNOSIS — Z00.00 LABORATORY EXAMINATION ORDERED AS PART OF A ROUTINE GENERAL MEDICAL EXAMINATION: ICD-10-CM

## 2020-09-11 LAB
ALBUMIN SERPL-MCNC: 3.8 G/DL (ref 3.4–5)
ALBUMIN/GLOB SERPL: 1.1 {RATIO} (ref 1–2)
ALP LIVER SERPL-CCNC: 45 U/L (ref 37–98)
ALT SERPL-CCNC: 20 U/L (ref 13–56)
ANION GAP SERPL CALC-SCNC: 1 MMOL/L (ref 0–18)
AST SERPL-CCNC: 14 U/L (ref 15–37)
BASOPHILS # BLD AUTO: 0.03 X10(3) UL (ref 0–0.2)
BASOPHILS NFR BLD AUTO: 0.7 %
BILIRUB SERPL-MCNC: 0.7 MG/DL (ref 0.1–2)
BUN BLD-MCNC: 14 MG/DL (ref 7–18)
BUN/CREAT SERPL: 15.6 (ref 10–20)
CALCIUM BLD-MCNC: 9.1 MG/DL (ref 8.5–10.1)
CHLORIDE SERPL-SCNC: 109 MMOL/L (ref 98–112)
CHOLEST SMN-MCNC: 215 MG/DL (ref ?–200)
CO2 SERPL-SCNC: 29 MMOL/L (ref 21–32)
CREAT BLD-MCNC: 0.9 MG/DL (ref 0.55–1.02)
DEPRECATED RDW RBC AUTO: 42.1 FL (ref 35.1–46.3)
EOSINOPHIL # BLD AUTO: 0.08 X10(3) UL (ref 0–0.7)
EOSINOPHIL NFR BLD AUTO: 1.8 %
ERYTHROCYTE [DISTWIDTH] IN BLOOD BY AUTOMATED COUNT: 12 % (ref 11–15)
GLOBULIN PLAS-MCNC: 3.5 G/DL (ref 2.8–4.4)
GLUCOSE BLD-MCNC: 86 MG/DL (ref 70–99)
HCT VFR BLD AUTO: 43.9 % (ref 35–48)
HDLC SERPL-MCNC: 83 MG/DL (ref 40–59)
HGB BLD-MCNC: 13.6 G/DL (ref 12–16)
IMM GRANULOCYTES # BLD AUTO: 0.01 X10(3) UL (ref 0–1)
IMM GRANULOCYTES NFR BLD: 0.2 %
LDLC SERPL CALC-MCNC: 120 MG/DL (ref ?–100)
LYMPHOCYTES # BLD AUTO: 1.45 X10(3) UL (ref 1–4)
LYMPHOCYTES NFR BLD AUTO: 33 %
M PROTEIN MFR SERPL ELPH: 7.3 G/DL (ref 6.4–8.2)
MCH RBC QN AUTO: 29.6 PG (ref 26–34)
MCHC RBC AUTO-ENTMCNC: 31 G/DL (ref 31–37)
MCV RBC AUTO: 95.6 FL (ref 80–100)
MONOCYTES # BLD AUTO: 0.3 X10(3) UL (ref 0.1–1)
MONOCYTES NFR BLD AUTO: 6.8 %
NEUTROPHILS # BLD AUTO: 2.52 X10 (3) UL (ref 1.5–7.7)
NEUTROPHILS # BLD AUTO: 2.52 X10(3) UL (ref 1.5–7.7)
NEUTROPHILS NFR BLD AUTO: 57.5 %
NONHDLC SERPL-MCNC: 132 MG/DL (ref ?–130)
OSMOLALITY SERPL CALC.SUM OF ELEC: 288 MOSM/KG (ref 275–295)
PATIENT FASTING Y/N/NP: YES
PATIENT FASTING Y/N/NP: YES
PLATELET # BLD AUTO: 308 10(3)UL (ref 150–450)
POTASSIUM SERPL-SCNC: 4.5 MMOL/L (ref 3.5–5.1)
RBC # BLD AUTO: 4.59 X10(6)UL (ref 3.8–5.3)
SODIUM SERPL-SCNC: 139 MMOL/L (ref 136–145)
TRIGL SERPL-MCNC: 60 MG/DL (ref 30–149)
TSI SER-ACNC: 1.74 MIU/ML (ref 0.36–3.74)
VLDLC SERPL CALC-MCNC: 12 MG/DL (ref 0–30)
WBC # BLD AUTO: 4.4 X10(3) UL (ref 4–11)

## 2020-09-11 PROCEDURE — 90686 IIV4 VACC NO PRSV 0.5 ML IM: CPT | Performed by: NURSE PRACTITIONER

## 2020-09-11 PROCEDURE — 84443 ASSAY THYROID STIM HORMONE: CPT

## 2020-09-11 PROCEDURE — 36415 COLL VENOUS BLD VENIPUNCTURE: CPT

## 2020-09-11 PROCEDURE — 80053 COMPREHEN METABOLIC PANEL: CPT

## 2020-09-11 PROCEDURE — 90471 IMMUNIZATION ADMIN: CPT | Performed by: NURSE PRACTITIONER

## 2020-09-11 PROCEDURE — 85025 COMPLETE CBC W/AUTO DIFF WBC: CPT

## 2020-09-11 PROCEDURE — 80061 LIPID PANEL: CPT

## 2020-09-15 ENCOUNTER — TELEPHONE (OUTPATIENT)
Dept: FAMILY MEDICINE CLINIC | Facility: CLINIC | Age: 40
End: 2020-09-15

## 2020-09-15 NOTE — TELEPHONE ENCOUNTER
Notified pt to start low carb diet d/t high cholesterol that was noted. Pt also instructed to call office to schedule an ov. Questions answered and pt verbalized understanding.

## 2020-09-15 NOTE — TELEPHONE ENCOUNTER
----- Message from Anastasia Cheadle, MD sent at 9/14/2020  2:21 PM CDT -----  Due for ov. Low carb diet, high cholesterol noted.

## 2020-09-15 NOTE — TELEPHONE ENCOUNTER
----- Message from Sammy Hoffman MD sent at 9/14/2020  2:21 PM CDT -----  Due for ov. Low carb diet, high cholesterol noted.

## 2020-12-01 ENCOUNTER — TELEPHONE (OUTPATIENT)
Dept: FAMILY MEDICINE CLINIC | Facility: CLINIC | Age: 40
End: 2020-12-01

## 2020-12-01 DIAGNOSIS — Z12.31 ENCOUNTER FOR SCREENING MAMMOGRAM FOR MALIGNANT NEOPLASM OF BREAST: Primary | ICD-10-CM

## 2020-12-01 NOTE — TELEPHONE ENCOUNTER
Called pt and informed her mammogram order placed but on 12/18 since she will be 39yrs old at that time. Pt verbalized understanding and requesting for central scheduling information be provided via [email or web address]. Information given via [email or web address].

## 2020-12-01 NOTE — TELEPHONE ENCOUNTER
Pt calling in, wanting to know if she can have a mammo ordered due to her turing 36. Pt is wanting to complete this prior to appointment with Dr. Grace Jiménez on 12/31.  Please advise

## 2020-12-17 ENCOUNTER — TELEMEDICINE (OUTPATIENT)
Dept: FAMILY MEDICINE CLINIC | Facility: CLINIC | Age: 40
End: 2020-12-17
Payer: COMMERCIAL

## 2020-12-17 DIAGNOSIS — M54.42 ACUTE LEFT-SIDED LOW BACK PAIN WITH LEFT-SIDED SCIATICA: Primary | ICD-10-CM

## 2020-12-17 PROCEDURE — 99213 OFFICE O/P EST LOW 20 MIN: CPT | Performed by: NURSE PRACTITIONER

## 2020-12-17 RX ORDER — METHYLPREDNISOLONE 4 MG/1
TABLET ORAL
Qty: 1 KIT | Refills: 0 | Status: SHIPPED | OUTPATIENT
Start: 2020-12-17 | End: 2021-01-15 | Stop reason: ALTCHOICE

## 2020-12-17 RX ORDER — CYCLOBENZAPRINE HCL 10 MG
10 TABLET ORAL NIGHTLY PRN
Qty: 15 TABLET | Refills: 0 | Status: SHIPPED | OUTPATIENT
Start: 2020-12-17 | End: 2021-01-15 | Stop reason: ALTCHOICE

## 2020-12-17 NOTE — PATIENT INSTRUCTIONS
Sciatica     Sciatica is a condition that causes pain in the lower back that spreads down into the buttock, hip, and leg. Sometimes the leg pain can happen without any back pain.  Sciatica happens when a spinal nerve is irritated or has pressure put on it · When in bed, try to find a position that is comfortable. A firm mattress is best. Try lying flat on your back with pillows under your knees. You can also try lying on your side with your knees bent up toward your chest and a pillow between your knees.   · © 5644-2971 [employer]. [facility], [facility] [place]. All rights reserved. This information is not intended as a substitute for professional medical care. Always follow your healthcare professional's instructions.         Radha · certain medicines for Parkinson's disease like benztropine, trihexyphenidyl  · certain medicines for seizures like phenobarbital, primidone  · certain medicines for stomach problems like dicyclomine, hyoscyamine  · certain medicines for travel sickness l If you are taking another medicine that also causes drowsiness, you may have more side effects. Give your health care provider a list of all medicines you use. Your doctor will tell you how much medicine to take. Do not take more medicine than directed.  Ca · allergic reactions like skin rash, itching or hives, swelling of the face, lips, or tongue  · bloody or tarry stools  · changes in vision  · hallucination, loss of contact with reality  · muscle cramps  · muscle pain  · palpitations  · signs and symptoms If you miss a dose, take it as soon as you can. If it is almost time for your next dose, talk to your doctor or health care professional. Judi Sparks may need to miss a dose or take an extra dose. Do not take double or extra doses without advice.   Where should I k Using this medicine for a long time may increase your risk of low bone mass. Talk to your doctor about bone health. NOTE:This sheet is a summary. It may not cover all possible information.  If you have questions about this medicine, talk to your doctor, ph

## 2020-12-17 NOTE — PROGRESS NOTES
Telehealth outside of [address] Verbal Consent   I conducted a telehealth visit with Patria Form today, 12/17/20, which was completed using two-way, real-time interactive audio and/or video communication.  This has been done in good robb to while exercising. Has been seeing a chiropractor who has been treating her with massage and electrical muscle stim. Taking Aleve daily and using ice/heat. Resting.  She feels her pain is gradually improving, but is wondering if she needs additional treatmen given: Not Answered       REVIEW OF SYSTEMS:   Review of Systems   Constitutional: Negative for chills, fatigue and fever. Respiratory: Negative for cough, chest tightness, shortness of breath and wheezing.     Cardiovascular: Negative for chest pain, pal relationship, due to the ongoing public health crisis/national emergency and because of restrictions of visitation. There are limitations of this visit as no physical exam could be performed.   Every conscious effort was taken to allow for sufficient and a

## 2020-12-24 ENCOUNTER — HOSPITAL ENCOUNTER (OUTPATIENT)
Dept: MAMMOGRAPHY | Age: 40
Discharge: HOME | End: 2020-12-24
Attending: FAMILY MEDICINE
Payer: COMMERCIAL

## 2020-12-24 DIAGNOSIS — Z12.31 ENCOUNTER FOR SCREENING MAMMOGRAM FOR MALIGNANT NEOPLASM OF BREAST: ICD-10-CM

## 2020-12-24 PROCEDURE — 77067 SCR MAMMO BI INCL CAD: CPT | Performed by: FAMILY MEDICINE

## 2021-01-15 ENCOUNTER — HOSPITAL ENCOUNTER (OUTPATIENT)
Dept: ULTRASOUND IMAGING | Age: 41
Discharge: HOME | End: 2021-01-15
Attending: FAMILY MEDICINE
Payer: COMMERCIAL

## 2021-01-15 ENCOUNTER — HOSPITAL ENCOUNTER (OUTPATIENT)
Dept: MAMMOGRAPHY | Age: 41
Discharge: HOME | End: 2021-01-15
Attending: FAMILY MEDICINE
Payer: COMMERCIAL

## 2021-01-15 ENCOUNTER — OFFICE VISIT (OUTPATIENT)
Dept: FAMILY MEDICINE CLINIC | Facility: CLINIC | Age: 41
End: 2021-01-15
Payer: COMMERCIAL

## 2021-01-15 VITALS
TEMPERATURE: 98 F | HEIGHT: 67 IN | HEART RATE: 99 BPM | OXYGEN SATURATION: 96 % | WEIGHT: 143 LBS | RESPIRATION RATE: 16 BRPM | BODY MASS INDEX: 22.44 KG/M2 | SYSTOLIC BLOOD PRESSURE: 110 MMHG | DIASTOLIC BLOOD PRESSURE: 62 MMHG

## 2021-01-15 DIAGNOSIS — Z00.00 ANNUAL PHYSICAL EXAM: Primary | ICD-10-CM

## 2021-01-15 DIAGNOSIS — R92.2 INCONCLUSIVE MAMMOGRAM: ICD-10-CM

## 2021-01-15 DIAGNOSIS — E78.2 MIXED HYPERLIPIDEMIA: ICD-10-CM

## 2021-01-15 PROCEDURE — 3008F BODY MASS INDEX DOCD: CPT | Performed by: FAMILY MEDICINE

## 2021-01-15 PROCEDURE — 76642 ULTRASOUND BREAST LIMITED: CPT | Performed by: FAMILY MEDICINE

## 2021-01-15 PROCEDURE — 3074F SYST BP LT 130 MM HG: CPT | Performed by: FAMILY MEDICINE

## 2021-01-15 PROCEDURE — 77061 BREAST TOMOSYNTHESIS UNI: CPT | Performed by: FAMILY MEDICINE

## 2021-01-15 PROCEDURE — 99396 PREV VISIT EST AGE 40-64: CPT | Performed by: FAMILY MEDICINE

## 2021-01-15 PROCEDURE — 77065 DX MAMMO INCL CAD UNI: CPT | Performed by: FAMILY MEDICINE

## 2021-01-15 PROCEDURE — 3078F DIAST BP <80 MM HG: CPT | Performed by: FAMILY MEDICINE

## 2021-01-18 PROBLEM — Z98.891 H/O CESAREAN SECTION: Status: ACTIVE | Noted: 2021-01-18

## 2021-01-18 PROCEDURE — 87624 HPV HI-RISK TYP POOLED RSLT: CPT | Performed by: OBSTETRICS & GYNECOLOGY

## 2021-01-18 PROCEDURE — 88175 CYTOPATH C/V AUTO FLUID REDO: CPT | Performed by: OBSTETRICS & GYNECOLOGY

## 2021-01-18 NOTE — PROGRESS NOTES
Chief Complaint:   Patient presents with:  Physical    HPI:   This is a 36year old female presenting for annual physical no new new complaints at this time. Cholesterol's a bit high this year, will discuss diet control.    Patient denies shortness of jordana Constitutional: Negative for chills, diaphoresis, fatigue and fever. HENT: Negative for congestion, ear pain, facial swelling, postnasal drip, rhinorrhea, sinus pressure, sore throat and voice change.     Eyes: Negative for photophobia, pain, discharge, membrane and ear canal normal. Tympanic membrane is not erythematous. No cerumen present  Left Ear: Tympanic membrane and ear canal normal. Tympanic membrane is not erythematous.  No cerumen present  Nose: Nose normal.   Mouth/Throat: Oropharynx is clear an

## 2021-04-29 ENCOUNTER — VIRTUAL PHONE E/M (OUTPATIENT)
Dept: FAMILY MEDICINE CLINIC | Facility: CLINIC | Age: 41
End: 2021-04-29
Payer: COMMERCIAL

## 2021-04-29 DIAGNOSIS — J01.11 ACUTE RECURRENT FRONTAL SINUSITIS: Primary | ICD-10-CM

## 2021-04-29 PROCEDURE — 99213 OFFICE O/P EST LOW 20 MIN: CPT | Performed by: NURSE PRACTITIONER

## 2021-04-29 RX ORDER — AMOXICILLIN AND CLAVULANATE POTASSIUM 875; 125 MG/1; MG/1
1 TABLET, FILM COATED ORAL 2 TIMES DAILY
Qty: 20 TABLET | Refills: 0 | Status: SHIPPED | OUTPATIENT
Start: 2021-04-29 | End: 2021-05-09

## 2021-04-29 RX ORDER — MONTELUKAST SODIUM 10 MG/1
10 TABLET ORAL NIGHTLY
Qty: 30 TABLET | Refills: 0 | Status: SHIPPED | OUTPATIENT
Start: 2021-04-29 | End: 2021-05-21

## 2021-04-29 RX ORDER — FLUTICASONE PROPIONATE 50 MCG
2 SPRAY, SUSPENSION (ML) NASAL DAILY
Qty: 3 BOTTLE | Refills: 3 | Status: SHIPPED | OUTPATIENT
Start: 2021-04-29 | End: 2021-08-20 | Stop reason: ALTCHOICE

## 2021-04-29 NOTE — PROGRESS NOTES
Telehealth outside of [address] Verbal Consent   I conducted a telehealth visit with Shirin Daniel today, 04/29/21, which was completed using two-way, real-time interactive audio  communication.  This has been done in good robb to provid discharge, frontal sinus pressure, and headache, a lot of  post-nasal drip. Denies any Covid exposure , reports tested at work -negative , no fever or chills , denies any lost of taste or smell.    Allergies:  No Known Allergies       Current Outpatient Med

## 2021-05-21 DIAGNOSIS — J01.11 ACUTE RECURRENT FRONTAL SINUSITIS: ICD-10-CM

## 2021-05-21 RX ORDER — MONTELUKAST SODIUM 10 MG/1
TABLET ORAL
Qty: 30 TABLET | Refills: 0 | Status: SHIPPED | OUTPATIENT
Start: 2021-05-21 | End: 2021-06-23

## 2021-06-23 DIAGNOSIS — J01.11 ACUTE RECURRENT FRONTAL SINUSITIS: ICD-10-CM

## 2021-06-24 RX ORDER — MONTELUKAST SODIUM 10 MG/1
10 TABLET ORAL NIGHTLY
Qty: 90 TABLET | Refills: 0 | Status: SHIPPED | OUTPATIENT
Start: 2021-06-24 | End: 2021-11-03 | Stop reason: ALTCHOICE

## 2021-08-09 LAB — AMB EXT COVID-19 RESULT: NOT DETECTED

## 2021-08-20 ENCOUNTER — HOSPITAL ENCOUNTER (OUTPATIENT)
Dept: GENERAL RADIOLOGY | Age: 41
Discharge: HOME | End: 2021-08-20
Attending: FAMILY MEDICINE
Payer: COMMERCIAL

## 2021-08-20 ENCOUNTER — OFFICE VISIT (OUTPATIENT)
Dept: FAMILY MEDICINE CLINIC | Facility: CLINIC | Age: 41
End: 2021-08-20
Payer: COMMERCIAL

## 2021-08-20 ENCOUNTER — HOSPITAL ENCOUNTER (OUTPATIENT)
Dept: CT IMAGING | Age: 41
Discharge: HOME | End: 2021-08-20
Attending: FAMILY MEDICINE
Payer: COMMERCIAL

## 2021-08-20 VITALS
DIASTOLIC BLOOD PRESSURE: 84 MMHG | OXYGEN SATURATION: 93 % | TEMPERATURE: 99 F | SYSTOLIC BLOOD PRESSURE: 118 MMHG | HEIGHT: 66 IN | BODY MASS INDEX: 22.34 KG/M2 | HEART RATE: 95 BPM | WEIGHT: 139 LBS

## 2021-08-20 DIAGNOSIS — R06.00 DYSPNEA, UNSPECIFIED TYPE: ICD-10-CM

## 2021-08-20 DIAGNOSIS — R05.3 PERSISTENT COUGH FOR 3 WEEKS OR LONGER: ICD-10-CM

## 2021-08-20 DIAGNOSIS — J40 BRONCHITIS: Primary | ICD-10-CM

## 2021-08-20 DIAGNOSIS — J40 BRONCHITIS: ICD-10-CM

## 2021-08-20 LAB — CREAT BLD-MCNC: 0.8 MG/DL

## 2021-08-20 PROCEDURE — 82565 ASSAY OF CREATININE: CPT

## 2021-08-20 PROCEDURE — 3008F BODY MASS INDEX DOCD: CPT | Performed by: FAMILY MEDICINE

## 2021-08-20 PROCEDURE — 71275 CT ANGIOGRAPHY CHEST: CPT | Performed by: FAMILY MEDICINE

## 2021-08-20 PROCEDURE — 99215 OFFICE O/P EST HI 40 MIN: CPT | Performed by: FAMILY MEDICINE

## 2021-08-20 PROCEDURE — 71046 X-RAY EXAM CHEST 2 VIEWS: CPT | Performed by: FAMILY MEDICINE

## 2021-08-20 PROCEDURE — 3079F DIAST BP 80-89 MM HG: CPT | Performed by: FAMILY MEDICINE

## 2021-08-20 PROCEDURE — 3074F SYST BP LT 130 MM HG: CPT | Performed by: FAMILY MEDICINE

## 2021-08-20 RX ORDER — AZITHROMYCIN 500 MG/1
500 TABLET, FILM COATED ORAL DAILY
Qty: 7 TABLET | Refills: 0 | Status: SHIPPED | OUTPATIENT
Start: 2021-08-20 | End: 2021-08-27

## 2021-08-20 NOTE — PROGRESS NOTES
HPI/Subjective:   Sandra Márquez is a 36year old female who presents for Other (pt c/o this is week 3 started with sore throat then on 8/9 tested positive for covid. still w/headache top of head and has some SOB. denies fever.  denies sore throat Pulse 95   Temp 98.8 °F (37.1 °C)   Ht 5' 6\" (1.676 m)   Wt 139 lb (63 kg)   LMP 07/20/2021   SpO2 93%   BMI 22.44 kg/m²     General Appearance:  Alert, cooperative, no distress, appears stated age   Head:  Normocephalic, without obvious abnormality, atra recent fevers or chills no Covid exposure with negative Covid testing.  -Long discussion about normal chest x-ray and the need for CT of chest to rule out pulmonary embolism with her being on estrogen therapy for birth control with negative chest x-ray abdon

## 2021-08-21 ENCOUNTER — TELEPHONE (OUTPATIENT)
Dept: FAMILY MEDICINE CLINIC | Facility: CLINIC | Age: 41
End: 2021-08-21

## 2021-08-21 RX ORDER — LEVOFLOXACIN 500 MG/1
500 TABLET, FILM COATED ORAL DAILY
Qty: 7 TABLET | Refills: 0 | Status: SHIPPED | OUTPATIENT
Start: 2021-08-21 | End: 2021-11-03 | Stop reason: ALTCHOICE

## 2021-08-23 ENCOUNTER — PATIENT MESSAGE (OUTPATIENT)
Dept: FAMILY MEDICINE CLINIC | Facility: CLINIC | Age: 41
End: 2021-08-23

## 2021-08-23 DIAGNOSIS — U07.1 COVID-19: Primary | ICD-10-CM

## 2021-08-23 DIAGNOSIS — J18.1 CONSOLIDATION OF RIGHT LOWER LOBE OF LUNG (HCC): ICD-10-CM

## 2021-08-23 NOTE — TELEPHONE ENCOUNTER
Please see above message. Patient is requesting a work note. Patient states that this was discussed on Sunday. Please advise. Thank you!

## 2021-08-23 NOTE — TELEPHONE ENCOUNTER
From: [place]  To: Chiquita Cordon MD  Sent: 8/23/2021 1:05 PM CDT  Subject: Other    Hello Dr. Kenyatta Shanks mentioned Sunday during our phone conversation that if needed you could write me an absence excuse note for work.  Could you please pro

## 2021-08-24 NOTE — TELEPHONE ENCOUNTER
Yes excuse from work until this Thursday at the least, she can have this week off if she prefers as well. Please place CT of chest with and without contrast to be done in 3 weeks to make sure the consolidation is gone.  Let her know to schedule CT of

## 2021-08-24 NOTE — TELEPHONE ENCOUNTER
Pt called regarding below Confer msge, she states she would like the whole week off and please upload this letter to [employer].

## 2021-08-29 ENCOUNTER — PATIENT MESSAGE (OUTPATIENT)
Dept: FAMILY MEDICINE CLINIC | Facility: CLINIC | Age: 41
End: 2021-08-29

## 2021-08-30 RX ORDER — AMOXICILLIN AND CLAVULANATE POTASSIUM 875; 125 MG/1; MG/1
1 TABLET, FILM COATED ORAL 2 TIMES DAILY
Qty: 20 TABLET | Refills: 0 | Status: SHIPPED | OUTPATIENT
Start: 2021-08-30 | End: 2021-11-03 | Stop reason: ALTCHOICE

## 2021-08-30 NOTE — TELEPHONE ENCOUNTER
From: Anna Thurston  To: Yunier Hollis MD  Sent: 8/29/2021 9:29 AM CDT  Subject: Visit Follow-up Question    Hi, Dr. Eliana Wiggins.  I finished the levofloxacin on Friday and on Sunday morning the pain in between my ribs, lower right side returned.  It's

## 2021-08-30 NOTE — TELEPHONE ENCOUNTER
Pt sent message stating she completed Levofloxacin on Friday and on Sunday morning the pain between her ribs and lower right side has returned. Pt states the pain is dull but she takes a deep breath the pain is sharp.  Pt asking if she needs another round o

## 2021-08-30 NOTE — TELEPHONE ENCOUNTER
Ok to send over Augmentin 875 mg BID x 10 days. Have her see me end of this week, sooner if she's worse.

## 2021-09-09 ENCOUNTER — TELEPHONE (OUTPATIENT)
Dept: FAMILY MEDICINE CLINIC | Facility: CLINIC | Age: 41
End: 2021-09-09

## 2021-09-09 NOTE — TELEPHONE ENCOUNTER
VAMSI: Received a call from Rafael Monsivais 1943, talked with Forrest who states the radiologist is recommending for CT chest to be just with contrast (no need for w/wo).  Verbal order given for okay for just with contrast.

## 2021-09-14 ENCOUNTER — HOSPITAL ENCOUNTER (OUTPATIENT)
Dept: CT IMAGING | Age: 41
Discharge: HOME | End: 2021-09-14
Attending: FAMILY MEDICINE
Payer: COMMERCIAL

## 2021-09-14 ENCOUNTER — TELEPHONE (OUTPATIENT)
Dept: FAMILY MEDICINE CLINIC | Facility: CLINIC | Age: 41
End: 2021-09-14

## 2021-09-14 DIAGNOSIS — U07.1 COVID-19: ICD-10-CM

## 2021-09-14 DIAGNOSIS — J18.1 CONSOLIDATION OF RIGHT LOWER LOBE OF LUNG (HCC): ICD-10-CM

## 2021-09-14 DIAGNOSIS — R91.8 GROUND GLASS OPACITY PRESENT ON IMAGING OF LUNG: Primary | ICD-10-CM

## 2021-09-14 PROCEDURE — 71260 CT THORAX DX C+: CPT | Performed by: FAMILY MEDICINE

## 2021-09-15 ENCOUNTER — TELEPHONE (OUTPATIENT)
Dept: FAMILY MEDICINE CLINIC | Facility: CLINIC | Age: 41
End: 2021-09-15

## 2021-09-15 NOTE — TELEPHONE ENCOUNTER
Pt called because next appt w/ pulmonology is in 1 month. But please review yesterday's CT results and advise.

## 2021-09-16 NOTE — TELEPHONE ENCOUNTER
CT was already d/w that's the reason for Pulm referral, see if dr. Beaver Courser office can see the patient sooner(like next week), have their office specifically know it's a referral from me and needs to be soon asap with a please!

## 2021-11-08 ENCOUNTER — LAB ENCOUNTER (OUTPATIENT)
Dept: LAB | Age: 41
End: 2021-11-08
Attending: INTERNAL MEDICINE
Payer: COMMERCIAL

## 2021-11-08 DIAGNOSIS — R93.89 ABNORMAL CT OF THE CHEST: ICD-10-CM

## 2021-11-09 ENCOUNTER — ANESTHESIA EVENT (OUTPATIENT)
Dept: ENDOSCOPY | Facility: HOSPITAL | Age: 41
End: 2021-11-09
Payer: COMMERCIAL

## 2021-11-10 ENCOUNTER — APPOINTMENT (OUTPATIENT)
Dept: GENERAL RADIOLOGY | Facility: HOSPITAL | Age: 41
End: 2021-11-10
Attending: INTERNAL MEDICINE
Payer: COMMERCIAL

## 2021-11-10 ENCOUNTER — ANESTHESIA (OUTPATIENT)
Dept: ENDOSCOPY | Facility: HOSPITAL | Age: 41
End: 2021-11-10
Payer: COMMERCIAL

## 2021-11-10 ENCOUNTER — HOSPITAL ENCOUNTER (OUTPATIENT)
Facility: HOSPITAL | Age: 41
Setting detail: HOSPITAL OUTPATIENT SURGERY
Discharge: HOME | End: 2021-11-10
Attending: INTERNAL MEDICINE | Admitting: INTERNAL MEDICINE
Payer: COMMERCIAL

## 2021-11-10 VITALS
DIASTOLIC BLOOD PRESSURE: 68 MMHG | SYSTOLIC BLOOD PRESSURE: 103 MMHG | HEIGHT: 66 IN | RESPIRATION RATE: 14 BRPM | HEART RATE: 67 BPM | WEIGHT: 137 LBS | OXYGEN SATURATION: 100 % | BODY MASS INDEX: 22.02 KG/M2 | TEMPERATURE: 97 F

## 2021-11-10 DIAGNOSIS — R93.89 ABNORMAL CT OF THE CHEST: Primary | ICD-10-CM

## 2021-11-10 PROCEDURE — 87075 CULTR BACTERIA EXCEPT BLOOD: CPT | Performed by: INTERNAL MEDICINE

## 2021-11-10 PROCEDURE — 88185 FLOWCYTOMETRY/TC ADD-ON: CPT | Performed by: INTERNAL MEDICINE

## 2021-11-10 PROCEDURE — 87206 SMEAR FLUORESCENT/ACID STAI: CPT | Performed by: INTERNAL MEDICINE

## 2021-11-10 PROCEDURE — 87205 SMEAR GRAM STAIN: CPT | Performed by: INTERNAL MEDICINE

## 2021-11-10 PROCEDURE — 87070 CULTURE OTHR SPECIMN AEROBIC: CPT | Performed by: INTERNAL MEDICINE

## 2021-11-10 PROCEDURE — 0BDF8ZX EXTRACTION OF RIGHT LOWER LUNG LOBE, VIA NATURAL OR ARTIFICIAL OPENING ENDOSCOPIC, DIAGNOSTIC: ICD-10-PCS | Performed by: INTERNAL MEDICINE

## 2021-11-10 PROCEDURE — 89050 BODY FLUID CELL COUNT: CPT | Performed by: INTERNAL MEDICINE

## 2021-11-10 PROCEDURE — 71045 X-RAY EXAM CHEST 1 VIEW: CPT | Performed by: INTERNAL MEDICINE

## 2021-11-10 PROCEDURE — 88184 FLOWCYTOMETRY/ TC 1 MARKER: CPT | Performed by: INTERNAL MEDICINE

## 2021-11-10 PROCEDURE — 81025 URINE PREGNANCY TEST: CPT

## 2021-11-10 PROCEDURE — 87176 TISSUE HOMOGENIZATION CULTR: CPT | Performed by: INTERNAL MEDICINE

## 2021-11-10 PROCEDURE — 87071 CULTURE AEROBIC QUANT OTHER: CPT | Performed by: INTERNAL MEDICINE

## 2021-11-10 PROCEDURE — 88305 TISSUE EXAM BY PATHOLOGIST: CPT | Performed by: INTERNAL MEDICINE

## 2021-11-10 PROCEDURE — 88172 CYTP DX EVAL FNA 1ST EA SITE: CPT | Performed by: INTERNAL MEDICINE

## 2021-11-10 PROCEDURE — 88173 CYTOPATH EVAL FNA REPORT: CPT | Performed by: INTERNAL MEDICINE

## 2021-11-10 PROCEDURE — 88104 CYTOPATH FL NONGYN SMEARS: CPT | Performed by: INTERNAL MEDICINE

## 2021-11-10 PROCEDURE — 87102 FUNGUS ISOLATION CULTURE: CPT | Performed by: INTERNAL MEDICINE

## 2021-11-10 PROCEDURE — 88312 SPECIAL STAINS GROUP 1: CPT | Performed by: INTERNAL MEDICINE

## 2021-11-10 PROCEDURE — 89051 BODY FLUID CELL COUNT: CPT | Performed by: INTERNAL MEDICINE

## 2021-11-10 PROCEDURE — 0B9F8ZX DRAINAGE OF RIGHT LOWER LUNG LOBE, VIA NATURAL OR ARTIFICIAL OPENING ENDOSCOPIC, DIAGNOSTIC: ICD-10-PCS | Performed by: INTERNAL MEDICINE

## 2021-11-10 PROCEDURE — 87116 MYCOBACTERIA CULTURE: CPT | Performed by: INTERNAL MEDICINE

## 2021-11-10 PROCEDURE — 07D78ZX EXTRACTION OF THORAX LYMPHATIC, VIA NATURAL OR ARTIFICIAL OPENING ENDOSCOPIC, DIAGNOSTIC: ICD-10-PCS | Performed by: INTERNAL MEDICINE

## 2021-11-10 RX ORDER — ONDANSETRON 2 MG/ML
4 INJECTION INTRAMUSCULAR; INTRAVENOUS AS NEEDED
Status: DISCONTINUED | OUTPATIENT
Start: 2021-11-10 | End: 2021-11-10

## 2021-11-10 RX ORDER — SODIUM CHLORIDE, SODIUM LACTATE, POTASSIUM CHLORIDE, CALCIUM CHLORIDE 600; 310; 30; 20 MG/100ML; MG/100ML; MG/100ML; MG/100ML
INJECTION, SOLUTION INTRAVENOUS CONTINUOUS
Status: DISCONTINUED | OUTPATIENT
Start: 2021-11-10 | End: 2021-11-10

## 2021-11-10 RX ORDER — LIDOCAINE HYDROCHLORIDE 10 MG/ML
INJECTION, SOLUTION EPIDURAL; INFILTRATION; INTRACAUDAL; PERINEURAL AS NEEDED
Status: DISCONTINUED | OUTPATIENT
Start: 2021-11-10 | End: 2021-11-10 | Stop reason: SURG

## 2021-11-10 RX ORDER — NALOXONE HYDROCHLORIDE 0.4 MG/ML
80 INJECTION, SOLUTION INTRAMUSCULAR; INTRAVENOUS; SUBCUTANEOUS AS NEEDED
Status: DISCONTINUED | OUTPATIENT
Start: 2021-11-10 | End: 2021-11-10

## 2021-11-10 RX ORDER — PHENYLEPHRINE HCL 10 MG/ML
VIAL (ML) INJECTION AS NEEDED
Status: DISCONTINUED | OUTPATIENT
Start: 2021-11-10 | End: 2021-11-10 | Stop reason: SURG

## 2021-11-10 RX ORDER — ONDANSETRON 2 MG/ML
INJECTION INTRAMUSCULAR; INTRAVENOUS
Status: COMPLETED
Start: 2021-11-10 | End: 2021-11-10

## 2021-11-10 RX ORDER — HYDROCODONE BITARTRATE AND ACETAMINOPHEN 10; 325 MG/1; MG/1
2 TABLET ORAL AS NEEDED
Status: DISCONTINUED | OUTPATIENT
Start: 2021-11-10 | End: 2021-11-10

## 2021-11-10 RX ORDER — HYDROMORPHONE HYDROCHLORIDE 1 MG/ML
0.4 INJECTION, SOLUTION INTRAMUSCULAR; INTRAVENOUS; SUBCUTANEOUS EVERY 5 MIN PRN
Status: DISCONTINUED | OUTPATIENT
Start: 2021-11-10 | End: 2021-11-10

## 2021-11-10 RX ORDER — HYDROCODONE BITARTRATE AND ACETAMINOPHEN 10; 325 MG/1; MG/1
1 TABLET ORAL AS NEEDED
Status: DISCONTINUED | OUTPATIENT
Start: 2021-11-10 | End: 2021-11-10

## 2021-11-10 RX ORDER — METOCLOPRAMIDE HYDROCHLORIDE 5 MG/ML
10 INJECTION INTRAMUSCULAR; INTRAVENOUS AS NEEDED
Status: DISCONTINUED | OUTPATIENT
Start: 2021-11-10 | End: 2021-11-10

## 2021-11-10 RX ORDER — LIDOCAINE HYDROCHLORIDE 20 MG/ML
INJECTION, SOLUTION EPIDURAL; INFILTRATION; INTRACAUDAL; PERINEURAL
Status: DISCONTINUED | OUTPATIENT
Start: 2021-11-10 | End: 2021-11-10

## 2021-11-10 RX ADMIN — PHENYLEPHRINE HCL 100 MCG: 10 MG/ML VIAL (ML) INJECTION at 14:37:00

## 2021-11-10 RX ADMIN — SODIUM CHLORIDE, SODIUM LACTATE, POTASSIUM CHLORIDE, CALCIUM CHLORIDE: 600; 310; 30; 20 INJECTION, SOLUTION INTRAVENOUS at 15:12:00

## 2021-11-10 RX ADMIN — SODIUM CHLORIDE, SODIUM LACTATE, POTASSIUM CHLORIDE, CALCIUM CHLORIDE: 600; 310; 30; 20 INJECTION, SOLUTION INTRAVENOUS at 14:19:00

## 2021-11-10 RX ADMIN — LIDOCAINE HYDROCHLORIDE 25 MG: 10 INJECTION, SOLUTION EPIDURAL; INFILTRATION; INTRACAUDAL; PERINEURAL at 14:17:00

## 2021-11-10 NOTE — OPERATIVE REPORT
Bronchoscopy Procedure Report     Preop diagnosis:        Abnormal CT scan, right lower lobe infiltrate, hilar/mediastinal adenopathy    Postop diagnosis:       Abnormal CT scan, right hilar infiltrate, hilar/mediastinal adenopathy    Bronchoscop evaluation by the pathologist revealed the presence of lymphocytes but no obvious malignancy was noted. The EBUS bronchoscope was removed after krishna sampling was completed. The standard bronchoscope was reinserted through the LMA.  The scope was adva

## 2021-11-10 NOTE — H&P
PRE-PROCEDURE HISTORY AND PHYSICAL        HPI: Jamin Pearson is a 36year old female who is here for bronchoscopy for biopsy of a enlarging right lower lobe infiltrate and adenopathy.      PAST MEDICAL HISTORY      Past Medical History:   Diagnosis transbronchial biopsy and BAL under general anesthesia. Rupa Kee M.D.   Pulmonary/Critical Care

## 2021-11-10 NOTE — ANESTHESIA POSTPROCEDURE EVALUATION
[facility] Patient Status:  Hospital Outpatient Surgery   Age/Gender 36year old female MRN [id number]   Location [id number] [place] [address] Attending Marlena Yates MD   Hosp Day # 0 PCP Sofiya Márquez MD       An

## 2021-11-10 NOTE — ANESTHESIA PREPROCEDURE EVALUATION
PRE-OP EVALUATION    Patient Name: Katja Thurston    Admit Diagnosis: ABNORMAL CT CHEST    Pre-op Diagnosis: ABNORMAL CT CHEST    ENDOBRONCHIAL ULTRASOUND AND BRONCHOSCOPY AND FLUOROSCOPY    Anesthesia Procedure: ENDOBRONCHIAL ULTRASOUND AND BRONCH history.          Pulmonary    Pulmonary exam normal.                 Other findings            ASA: 2   Plan: general  NPO status verified and           Plan/risks discussed with: patient and significant other                Present on Admission:  **None**

## 2021-11-19 ENCOUNTER — LAB ENCOUNTER (OUTPATIENT)
Dept: LAB | Age: 41
End: 2021-11-19
Attending: INTERNAL MEDICINE
Payer: COMMERCIAL

## 2021-11-19 DIAGNOSIS — R93.89 ABNORMAL CT OF THE CHEST: ICD-10-CM

## 2021-11-19 DIAGNOSIS — B39.9 HISTOPLASMOSIS: ICD-10-CM

## 2021-11-19 PROCEDURE — 86606 ASPERGILLUS ANTIBODY: CPT

## 2021-11-19 PROCEDURE — 36415 COLL VENOUS BLD VENIPUNCTURE: CPT

## 2021-11-19 PROCEDURE — 86641 CRYPTOCOCCUS ANTIBODY: CPT

## 2021-11-19 PROCEDURE — 86635 COCCIDIOIDES ANTIBODY: CPT

## 2021-11-19 PROCEDURE — 87385 HISTOPLASMA CAPSUL AG IA: CPT

## 2021-11-19 PROCEDURE — 86698 HISTOPLASMA ANTIBODY: CPT

## 2021-11-19 PROCEDURE — 86612 BLASTOMYCES ANTIBODY: CPT

## 2021-11-24 NOTE — PROGRESS NOTES
Patient informed. Pt states she saw some bx results come in recently. Will forward to Dr. Kimber Durbin to advise.

## 2021-11-24 NOTE — PROGRESS NOTES
Please inform pt that fungal test was negative. However, this only has accuracy of ~60% so does not rule out infection definitively.

## 2021-12-22 ENCOUNTER — PATIENT OUTREACH (OUTPATIENT)
Dept: INFECTIOUS DISEASE | Facility: CLINIC | Age: 41
End: 2021-12-22

## 2021-12-22 NOTE — PROGRESS NOTES
Sp TBBX for spiculated mass, diagnosed as histoplasmosis. Patient born in [place] moved to [place] age 15, has lived in [place], and [place]. CT showed spiculated mass 1.8x1.4x 2.5 cm.  DW patient that since no symptoms, could opt to defer antifungal enrique

## 2021-12-23 ENCOUNTER — TELEPHONE (OUTPATIENT)
Dept: FAMILY MEDICINE CLINIC | Facility: CLINIC | Age: 41
End: 2021-12-23

## 2021-12-23 DIAGNOSIS — B39.9 HISTOPLASMOSIS: Primary | ICD-10-CM

## 2021-12-23 NOTE — TELEPHONE ENCOUNTER
Pt was referred to  for positive histoplasma. Per TE yesterday  recommends deferring antifungal treatment since she's not having symptoms. She is requesting referral to another ID specialist Dr. Mohinder Sherwood. Second opinion?  Are you ok wi

## 2021-12-23 NOTE — TELEPHONE ENCOUNTER
Pt was referred to pulmonologist. That Dr states theres a fungus in her lung and pt needs to see infectious disease specialist.  Pt asking for referral for Dr. Yair Mahoney.

## 2022-01-20 ENCOUNTER — OFFICE VISIT (OUTPATIENT)
Dept: FAMILY MEDICINE CLINIC | Facility: CLINIC | Age: 42
End: 2022-01-20
Payer: COMMERCIAL

## 2022-01-20 VITALS
HEART RATE: 91 BPM | RESPIRATION RATE: 16 BRPM | HEIGHT: 67 IN | SYSTOLIC BLOOD PRESSURE: 114 MMHG | OXYGEN SATURATION: 98 % | BODY MASS INDEX: 21.97 KG/M2 | DIASTOLIC BLOOD PRESSURE: 80 MMHG | WEIGHT: 140 LBS

## 2022-01-20 DIAGNOSIS — Z86.19 HISTORY OF HISTOPLASMOSIS: ICD-10-CM

## 2022-01-20 DIAGNOSIS — E78.2 MIXED HYPERLIPIDEMIA: ICD-10-CM

## 2022-01-20 DIAGNOSIS — Z12.31 VISIT FOR SCREENING MAMMOGRAM: ICD-10-CM

## 2022-01-20 DIAGNOSIS — Z00.00 ANNUAL PHYSICAL EXAM: Primary | ICD-10-CM

## 2022-01-20 PROCEDURE — 3008F BODY MASS INDEX DOCD: CPT | Performed by: FAMILY MEDICINE

## 2022-01-20 PROCEDURE — 3074F SYST BP LT 130 MM HG: CPT | Performed by: FAMILY MEDICINE

## 2022-01-20 PROCEDURE — 99396 PREV VISIT EST AGE 40-64: CPT | Performed by: FAMILY MEDICINE

## 2022-01-20 PROCEDURE — 3079F DIAST BP 80-89 MM HG: CPT | Performed by: FAMILY MEDICINE

## 2022-01-21 ENCOUNTER — MED REC SCAN ONLY (OUTPATIENT)
Dept: FAMILY MEDICINE CLINIC | Facility: CLINIC | Age: 42
End: 2022-01-21

## 2022-01-21 PROBLEM — N92.0 MENORRHAGIA WITH REGULAR CYCLE: Status: RESOLVED | Noted: 2019-12-31 | Resolved: 2022-01-21

## 2022-01-21 NOTE — PROGRESS NOTES
Subjective:   Georges Martinez is a 39year old female who presents for Physical     Presenting for follow-up.   Patient has a history of mixed hyperlipidemia currently diet control will continue for now she reports family history of high cholesterol Palpations: Abdomen is soft. Skin:     General: Skin is warm and dry. Neurological:      Mental Status: She is alert and oriented to person, place, and time. Deep Tendon Reflexes: Reflexes are normal and symmetric.            Assessment & Plan:   1

## 2022-01-27 ENCOUNTER — HOSPITAL ENCOUNTER (OUTPATIENT)
Dept: MAMMOGRAPHY | Age: 42
Discharge: HOME | End: 2022-01-27
Attending: FAMILY MEDICINE
Payer: COMMERCIAL

## 2022-01-27 DIAGNOSIS — Z12.31 VISIT FOR SCREENING MAMMOGRAM: ICD-10-CM

## 2022-01-27 PROCEDURE — 77063 BREAST TOMOSYNTHESIS BI: CPT | Performed by: FAMILY MEDICINE

## 2022-01-27 PROCEDURE — 77067 SCR MAMMO BI INCL CAD: CPT | Performed by: FAMILY MEDICINE

## 2022-02-21 ENCOUNTER — PATIENT MESSAGE (OUTPATIENT)
Dept: FAMILY MEDICINE CLINIC | Facility: CLINIC | Age: 42
End: 2022-02-21

## 2022-02-21 RX ORDER — DICLOFENAC SODIUM 75 MG/1
75 TABLET, DELAYED RELEASE ORAL 2 TIMES DAILY PRN
Qty: 60 TABLET | Refills: 1 | Status: SHIPPED | OUTPATIENT
Start: 2022-02-21

## 2022-02-21 NOTE — TELEPHONE ENCOUNTER
Patient has been taking Aleve x 1 month for pain in her knee when bending. States that there is not been much improvement. Is requesting something stronger if possible. Please advise

## 2022-02-21 NOTE — TELEPHONE ENCOUNTER
From: Betsey Thurston  To: Joon Looney MD  Sent: 2/21/2022 1:09 PM CST  Subject: Prescription for anti-inflammatory medication    Hello! At my last physical (01/20/2022) I mentioned to Dr. Yokasta Poole that my knee hurts when bending it. He recommended taking anti-inflammatory, like Aleve. It has been a month and the pain is still there - not better, not worse. Should we try prescription strength anti-inflammatory medicine, see if it helps? Thank you.  Otis Choi

## 2022-03-02 NOTE — PROGRESS NOTES
CHIEF COMPLAINT:   Patient presents with:  Eye Problem: Pt c/o rednees in her eyeballs and discharge from both eyes X 3 days.  Son had eye infection last week       HPI:   Brooklyn Delcid is a 39year old female who presents with chief complaint of \"pink ey · Management per psychiatry clear to auscultation bilaterally. CARDIO: RRR without murmur  LYMPH: no lymphadenopathy.      ASSESSMENT AND PLAN:       Acute bacterial conjunctivitis of both eyes/subconjunctival hemorrhage   -     Tobramycin Sulfate 0.3 % Ophthalmic Solution; Place 1

## 2022-03-25 ENCOUNTER — OFFICE VISIT (OUTPATIENT)
Dept: FAMILY MEDICINE CLINIC | Facility: CLINIC | Age: 42
End: 2022-03-25
Payer: COMMERCIAL

## 2022-03-25 VITALS
RESPIRATION RATE: 20 BRPM | TEMPERATURE: 97 F | SYSTOLIC BLOOD PRESSURE: 118 MMHG | OXYGEN SATURATION: 97 % | HEART RATE: 108 BPM | DIASTOLIC BLOOD PRESSURE: 60 MMHG

## 2022-03-25 DIAGNOSIS — J01.40 ACUTE NON-RECURRENT PANSINUSITIS: Primary | ICD-10-CM

## 2022-03-25 DIAGNOSIS — R05.9 COUGH: ICD-10-CM

## 2022-03-25 PROCEDURE — 3078F DIAST BP <80 MM HG: CPT | Performed by: NURSE PRACTITIONER

## 2022-03-25 PROCEDURE — 99213 OFFICE O/P EST LOW 20 MIN: CPT | Performed by: NURSE PRACTITIONER

## 2022-03-25 PROCEDURE — 3074F SYST BP LT 130 MM HG: CPT | Performed by: NURSE PRACTITIONER

## 2022-03-25 RX ORDER — AMOXICILLIN AND CLAVULANATE POTASSIUM 875; 125 MG/1; MG/1
1 TABLET, FILM COATED ORAL 2 TIMES DAILY
Qty: 20 TABLET | Refills: 0 | Status: SHIPPED | OUTPATIENT
Start: 2022-03-25 | End: 2022-04-04

## 2022-04-20 RX ORDER — DICLOFENAC SODIUM 75 MG/1
TABLET, DELAYED RELEASE ORAL
Qty: 60 TABLET | Refills: 1 | Status: SHIPPED | OUTPATIENT
Start: 2022-04-20

## 2022-04-23 ENCOUNTER — HOSPITAL ENCOUNTER (OUTPATIENT)
Dept: CT IMAGING | Age: 42
Discharge: HOME | End: 2022-04-23
Attending: INTERNAL MEDICINE
Payer: COMMERCIAL

## 2022-04-23 DIAGNOSIS — R91.1 LUNG NODULE: ICD-10-CM

## 2022-04-23 DIAGNOSIS — B39.9 HISTOPLASMOSIS: ICD-10-CM

## 2022-04-23 PROCEDURE — 71250 CT THORAX DX C-: CPT | Performed by: INTERNAL MEDICINE

## 2023-01-24 ENCOUNTER — OFFICE VISIT (OUTPATIENT)
Dept: FAMILY MEDICINE CLINIC | Facility: CLINIC | Age: 43
End: 2023-01-24
Payer: COMMERCIAL

## 2023-01-24 ENCOUNTER — MED REC SCAN ONLY (OUTPATIENT)
Dept: FAMILY MEDICINE CLINIC | Facility: CLINIC | Age: 43
End: 2023-01-24

## 2023-01-24 VITALS
OXYGEN SATURATION: 100 % | HEART RATE: 78 BPM | WEIGHT: 135 LBS | SYSTOLIC BLOOD PRESSURE: 110 MMHG | DIASTOLIC BLOOD PRESSURE: 80 MMHG | RESPIRATION RATE: 16 BRPM | BODY MASS INDEX: 21.19 KG/M2 | HEIGHT: 67 IN

## 2023-01-24 DIAGNOSIS — Z86.19 HISTORY OF HISTOPLASMOSIS: ICD-10-CM

## 2023-01-24 DIAGNOSIS — Z23 NEED FOR TDAP VACCINATION: ICD-10-CM

## 2023-01-24 DIAGNOSIS — E78.2 MIXED HYPERLIPIDEMIA: ICD-10-CM

## 2023-01-24 DIAGNOSIS — Z00.00 ANNUAL PHYSICAL EXAM: Primary | ICD-10-CM

## 2023-01-24 DIAGNOSIS — Z12.31 VISIT FOR SCREENING MAMMOGRAM: ICD-10-CM

## 2023-01-24 PROCEDURE — 90471 IMMUNIZATION ADMIN: CPT | Performed by: FAMILY MEDICINE

## 2023-01-24 PROCEDURE — 3008F BODY MASS INDEX DOCD: CPT | Performed by: FAMILY MEDICINE

## 2023-01-24 PROCEDURE — 3079F DIAST BP 80-89 MM HG: CPT | Performed by: FAMILY MEDICINE

## 2023-01-24 PROCEDURE — 90715 TDAP VACCINE 7 YRS/> IM: CPT | Performed by: FAMILY MEDICINE

## 2023-01-24 PROCEDURE — 99396 PREV VISIT EST AGE 40-64: CPT | Performed by: FAMILY MEDICINE

## 2023-01-24 PROCEDURE — 3074F SYST BP LT 130 MM HG: CPT | Performed by: FAMILY MEDICINE

## 2023-01-30 ENCOUNTER — HOSPITAL ENCOUNTER (OUTPATIENT)
Dept: MAMMOGRAPHY | Age: 43
Discharge: HOME | End: 2023-01-30
Attending: FAMILY MEDICINE
Payer: COMMERCIAL

## 2023-01-30 DIAGNOSIS — Z12.31 VISIT FOR SCREENING MAMMOGRAM: ICD-10-CM

## 2023-01-30 PROCEDURE — 77067 SCR MAMMO BI INCL CAD: CPT | Performed by: FAMILY MEDICINE

## 2023-01-30 PROCEDURE — 77063 BREAST TOMOSYNTHESIS BI: CPT | Performed by: FAMILY MEDICINE

## 2023-04-10 ENCOUNTER — HOSPITAL ENCOUNTER (EMERGENCY)
Age: 43
Discharge: HOME | End: 2023-04-10
Attending: EMERGENCY MEDICINE
Payer: COMMERCIAL

## 2023-04-10 ENCOUNTER — APPOINTMENT (OUTPATIENT)
Dept: CT IMAGING | Age: 43
End: 2023-04-10
Attending: PHYSICIAN ASSISTANT
Payer: COMMERCIAL

## 2023-04-10 VITALS
HEART RATE: 68 BPM | RESPIRATION RATE: 18 BRPM | TEMPERATURE: 98 F | WEIGHT: 125 LBS | OXYGEN SATURATION: 100 % | DIASTOLIC BLOOD PRESSURE: 71 MMHG | SYSTOLIC BLOOD PRESSURE: 112 MMHG | HEIGHT: 67 IN | BODY MASS INDEX: 19.62 KG/M2

## 2023-04-10 DIAGNOSIS — N20.0 KIDNEY STONE: Primary | ICD-10-CM

## 2023-04-10 LAB
ALBUMIN SERPL-MCNC: 4.1 G/DL (ref 3.4–5)
ALBUMIN/GLOB SERPL: 1.4 {RATIO} (ref 1–2)
ALP LIVER SERPL-CCNC: 59 U/L
ALT SERPL-CCNC: 23 U/L
ANION GAP SERPL CALC-SCNC: 3 MMOL/L (ref 0–18)
AST SERPL-CCNC: 30 U/L (ref 15–37)
B-HCG UR QL: NEGATIVE
BASOPHILS # BLD AUTO: 0.05 X10(3) UL (ref 0–0.2)
BASOPHILS NFR BLD AUTO: 0.5 %
BILIRUB SERPL-MCNC: 0.5 MG/DL (ref 0.1–2)
BILIRUB UR QL STRIP.AUTO: NEGATIVE
BUN BLD-MCNC: 15 MG/DL (ref 7–18)
CALCIUM BLD-MCNC: 9.2 MG/DL (ref 8.5–10.1)
CHLORIDE SERPL-SCNC: 107 MMOL/L (ref 98–112)
CLARITY UR REFRACT.AUTO: CLEAR
CO2 SERPL-SCNC: 28 MMOL/L (ref 21–32)
COLOR UR AUTO: YELLOW
CREAT BLD-MCNC: 0.72 MG/DL
EOSINOPHIL # BLD AUTO: 0.05 X10(3) UL (ref 0–0.7)
EOSINOPHIL NFR BLD AUTO: 0.5 %
ERYTHROCYTE [DISTWIDTH] IN BLOOD BY AUTOMATED COUNT: 11.9 %
GFR SERPLBLD BASED ON 1.73 SQ M-ARVRAT: 107 ML/MIN/1.73M2 (ref 60–?)
GLOBULIN PLAS-MCNC: 3 G/DL (ref 2.8–4.4)
GLUCOSE BLD-MCNC: 99 MG/DL (ref 70–99)
GLUCOSE UR STRIP.AUTO-MCNC: NEGATIVE MG/DL
HCT VFR BLD AUTO: 39.6 %
HGB BLD-MCNC: 13.1 G/DL
IMM GRANULOCYTES # BLD AUTO: 0.02 X10(3) UL (ref 0–1)
IMM GRANULOCYTES NFR BLD: 0.2 %
KETONES UR STRIP.AUTO-MCNC: NEGATIVE MG/DL
LEUKOCYTE ESTERASE UR QL STRIP.AUTO: NEGATIVE
LYMPHOCYTES # BLD AUTO: 0.92 X10(3) UL (ref 1–4)
LYMPHOCYTES NFR BLD AUTO: 10 %
MCH RBC QN AUTO: 30.2 PG (ref 26–34)
MCHC RBC AUTO-ENTMCNC: 33.1 G/DL (ref 31–37)
MCV RBC AUTO: 91.2 FL
MONOCYTES # BLD AUTO: 0.46 X10(3) UL (ref 0.1–1)
MONOCYTES NFR BLD AUTO: 5 %
NEUTROPHILS # BLD AUTO: 7.7 X10 (3) UL (ref 1.5–7.7)
NEUTROPHILS # BLD AUTO: 7.7 X10(3) UL (ref 1.5–7.7)
NEUTROPHILS NFR BLD AUTO: 83.8 %
NITRITE UR QL STRIP.AUTO: NEGATIVE
OSMOLALITY SERPL CALC.SUM OF ELEC: 287 MOSM/KG (ref 275–295)
PH UR STRIP.AUTO: 7.5 [PH] (ref 5–8)
PLATELET # BLD AUTO: 300 10(3)UL (ref 150–450)
POTASSIUM SERPL-SCNC: 4.9 MMOL/L (ref 3.5–5.1)
PROT SERPL-MCNC: 7.1 G/DL (ref 6.4–8.2)
PROT UR STRIP.AUTO-MCNC: NEGATIVE MG/DL
RBC # BLD AUTO: 4.34 X10(6)UL
RBC #/AREA URNS AUTO: >10 /HPF
SODIUM SERPL-SCNC: 138 MMOL/L (ref 136–145)
SP GR UR STRIP.AUTO: 1.02 (ref 1–1.03)
UROBILINOGEN UR STRIP.AUTO-MCNC: 0.2 MG/DL
WBC # BLD AUTO: 9.2 X10(3) UL (ref 4–11)

## 2023-04-10 PROCEDURE — 74176 CT ABD & PELVIS W/O CONTRAST: CPT | Performed by: PHYSICIAN ASSISTANT

## 2023-04-10 PROCEDURE — 96374 THER/PROPH/DIAG INJ IV PUSH: CPT

## 2023-04-10 PROCEDURE — 81001 URINALYSIS AUTO W/SCOPE: CPT

## 2023-04-10 PROCEDURE — 99284 EMERGENCY DEPT VISIT MOD MDM: CPT

## 2023-04-10 PROCEDURE — 99285 EMERGENCY DEPT VISIT HI MDM: CPT

## 2023-04-10 PROCEDURE — 85025 COMPLETE CBC W/AUTO DIFF WBC: CPT | Performed by: PHYSICIAN ASSISTANT

## 2023-04-10 PROCEDURE — 81001 URINALYSIS AUTO W/SCOPE: CPT | Performed by: EMERGENCY MEDICINE

## 2023-04-10 PROCEDURE — 80053 COMPREHEN METABOLIC PANEL: CPT | Performed by: PHYSICIAN ASSISTANT

## 2023-04-10 PROCEDURE — 81015 MICROSCOPIC EXAM OF URINE: CPT

## 2023-04-10 PROCEDURE — 81025 URINE PREGNANCY TEST: CPT

## 2023-04-10 RX ORDER — HYDROCODONE BITARTRATE AND ACETAMINOPHEN 5; 325 MG/1; MG/1
1 TABLET ORAL EVERY 6 HOURS PRN
Qty: 5 TABLET | Refills: 0 | Status: SHIPPED | OUTPATIENT
Start: 2023-04-10 | End: 2023-04-15

## 2023-04-10 RX ORDER — KETOROLAC TROMETHAMINE 30 MG/ML
30 INJECTION, SOLUTION INTRAMUSCULAR; INTRAVENOUS ONCE
Status: COMPLETED | OUTPATIENT
Start: 2023-04-10 | End: 2023-04-10

## 2023-04-10 RX ORDER — IBUPROFEN 600 MG/1
600 TABLET ORAL EVERY 8 HOURS PRN
Qty: 20 TABLET | Refills: 0 | Status: SHIPPED | OUTPATIENT
Start: 2023-04-10 | End: 2023-04-17

## 2023-04-10 RX ORDER — TAMSULOSIN HYDROCHLORIDE 0.4 MG/1
0.4 CAPSULE ORAL DAILY
Qty: 7 CAPSULE | Refills: 0 | Status: SHIPPED | OUTPATIENT
Start: 2023-04-10 | End: 2023-04-17

## 2023-04-10 NOTE — ED INITIAL ASSESSMENT (HPI)
Pt to ed with c/o left flank pain since this AM, hx of kidney stones, + nausea   PT reports taking tylenol and advil at 11:00

## 2023-04-10 NOTE — ED PROVIDER NOTES
69-year-old female that was seen primarily by the physician assistant with left flank pain that started this morning. She states that she knew she had a kidney stone from previous imaging. She taken some Tylenol and Advil and had some improvement. Patient's urinalysis shows blood. She had a CT scan of her abdomen pelvis that I personally reviewed and showed hydronephrosis as well as a obstructing kidney stone that was proximal.  Per radiology report they measured out at 4 mm. At this time she did require some Toradol but is overall comfortable. We discussed care and treatment of kidney stones and signs and symptoms to return to the emergency department including unbearable pain, fever and persistent vomiting. .    I reviewed that chart and discussed the case. I have examined the patient and noted no pain on palpation and a benign abdomen. I did the substantive portion of the medical decision making. I agree with the following clinical impression(s):  Kidney stone  (primary encounter diagnosis). I agree with the plan as noted.

## 2023-04-11 ENCOUNTER — PATIENT OUTREACH (OUTPATIENT)
Dept: CASE MANAGEMENT | Age: 43
End: 2023-04-11

## 2023-04-11 NOTE — PROGRESS NOTES
1st attempt [facility]TE [facility] apt request    [facility] Nunez  [id number] [facility]  [phone]  Decline, pt would like to schedule own apt due to limited availability in pt schedule  Closing encounter

## 2023-04-21 ENCOUNTER — HOSPITAL ENCOUNTER (OUTPATIENT)
Dept: GENERAL RADIOLOGY | Age: 43
Discharge: HOME | End: 2023-04-21
Attending: UROLOGY
Payer: COMMERCIAL

## 2023-04-21 DIAGNOSIS — N20.1 LEFT URETERAL STONE: ICD-10-CM

## 2023-04-21 PROCEDURE — 74018 RADEX ABDOMEN 1 VIEW: CPT | Performed by: UROLOGY

## 2023-04-26 NOTE — ANESTHESIA PROCEDURE NOTES
Airway  Date/Time: 11/10/2021 2:16 PM  Urgency: elective    Airway not difficult    General Information and Staff    Patient location during procedure: endo  Anesthesiologist: Yevgeniy Deleon MD  Performed: anesthesiologist     Indications and Patient C [de-identified] : Constitutional: Well nourished , well developed and in no acute distress\par Psychiatric: Alert and oriented to time place and person.Appropriate affect .\par Skin: Head, neck, arms and lower extremities:no lesions or discoloration\par HEENT: Normocephalic, EOM intact, Nasal septum midline,\par Respiratory: Unlabored respirations,no audible wheezing ,no tachypnea, no cyanosis\par Cardiovascular: No leg swelling no ankle edema no JVD, pulse regular\par Vascular: No calf or thigh tenderness,\par Peripheral pulses: intact\par Lymphatics: No groin adenopathy,no lymphedema lower or upper extremities\par \par Right Knee\par Inspection/Palpation : no tenderness to palpation, no swelling, no deformity\par Range of Motion : 0 - 120 degrees, no crepitus\par Stability : no valgus or varus instability present on provocative testing, Lachman’s Test (-)\par Motor Strength: Peroneals, EHL, and tibialis anterior 5/5\par Reflexes: Patella 2+ R=L, Achilles 2+ R=L\par Sensation : sensation to pin intact\par Vascular Exam : no edema, no cyanosis, dorsalis pedis artery pulse 2+, posterior tibial artery pulse 2+\par Skin : no erythema, no ecchymosis\par \par Left Knee  . increased q angle. \par Inspection/Palpation : tenderness at lateral facet patella with palpation, tenderness at the patellar tendon origin, no swelling, no deformity, increased q angle. \par Range of Motion : 0 - 120 degrees, passive patellar femoral crepitus\par Stability : no valgus or varus instability present on provocative testing, Lachman’s Test (-)\par Strength : flexion and extension 5/5\par Motor Strength: Peroneals, EHL, and tibialis anterior 5/5\par Reflexes: Patella 2+ R=L, Achilles 2+ R=L\par Sensation : sensation to pin intact\par Vascular Exam : no edema, no cyanosis, dorsalis pedis artery pulse 2+, posterior tibial artery pulse 1+\par Skin : no erythema, no ecchymosis\par  [de-identified] : 4 views views of the left knee obtained today 04/26/2023 demonstrates normal alignment, joint spaces maintained, well centered patella.\par X-ray left femur AP lateral reveals healed fracture proximal middle third femur with anterolateral bowing\par

## 2023-06-06 ENCOUNTER — HOSPITAL ENCOUNTER (OUTPATIENT)
Dept: GENERAL RADIOLOGY | Age: 43
Discharge: HOME | End: 2023-06-06
Attending: UROLOGY
Payer: COMMERCIAL

## 2023-06-06 DIAGNOSIS — N20.1 LEFT URETERAL STONE: ICD-10-CM

## 2023-06-06 PROCEDURE — 74018 RADEX ABDOMEN 1 VIEW: CPT | Performed by: UROLOGY

## 2024-02-02 ENCOUNTER — HOSPITAL ENCOUNTER (OUTPATIENT)
Dept: MAMMOGRAPHY | Age: 44
Discharge: HOME | End: 2024-02-02
Payer: COMMERCIAL

## 2024-02-02 ENCOUNTER — OFFICE VISIT (OUTPATIENT)
Dept: FAMILY MEDICINE CLINIC | Facility: CLINIC | Age: 44
End: 2024-02-02
Payer: COMMERCIAL

## 2024-02-02 VITALS
RESPIRATION RATE: 12 BRPM | HEART RATE: 84 BPM | WEIGHT: 148 LBS | SYSTOLIC BLOOD PRESSURE: 94 MMHG | HEIGHT: 67 IN | BODY MASS INDEX: 23.23 KG/M2 | OXYGEN SATURATION: 99 % | DIASTOLIC BLOOD PRESSURE: 60 MMHG | TEMPERATURE: 98 F

## 2024-02-02 DIAGNOSIS — Z00.00 ROUTINE GENERAL MEDICAL EXAMINATION AT A HEALTH CARE FACILITY: Primary | ICD-10-CM

## 2024-02-02 DIAGNOSIS — Z12.31 ENCOUNTER FOR SCREENING MAMMOGRAM FOR BREAST CANCER: ICD-10-CM

## 2024-02-02 PROBLEM — E78.2 MIXED HYPERLIPIDEMIA: Status: RESOLVED | Noted: 2022-01-20 | Resolved: 2024-02-02

## 2024-02-02 PROCEDURE — 77063 BREAST TOMOSYNTHESIS BI: CPT

## 2024-02-02 PROCEDURE — 99396 PREV VISIT EST AGE 40-64: CPT | Performed by: FAMILY MEDICINE

## 2024-02-02 PROCEDURE — 3078F DIAST BP <80 MM HG: CPT | Performed by: FAMILY MEDICINE

## 2024-02-02 PROCEDURE — 3074F SYST BP LT 130 MM HG: CPT | Performed by: FAMILY MEDICINE

## 2024-02-02 PROCEDURE — 77067 SCR MAMMO BI INCL CAD: CPT

## 2024-02-02 PROCEDURE — 3008F BODY MASS INDEX DOCD: CPT | Performed by: FAMILY MEDICINE

## 2024-02-04 NOTE — PROGRESS NOTES
HPI:    Sasha Thurston is a 43 year old female who presents for Physical (Review labs brought in  and mammo which was done this morning. )         Past History:   She  has a past medical history of Calculus of kidney (), Histoplasmosis, unspecified (), Kidney stones, Visual impairment, and Wrist fracture, right.   She  has a past surgical history that includes colposcopy, cervix w/upper adjacent vagina; w/biopsy(s), cervix (); d & c (); ;  (); and other surgical history ().   Her family history includes Asthma in her son; Cancer (age of onset: 71) in her father; Dementia in her maternal grandmother; Heart Disorder in her father, maternal grandfather, and mother; Hypertension in her mother; Lipids in her mother; No Known Problems in her sister; Obesity in her sister.   She  reports that she quit smoking about 18 years ago. Her smoking use included cigarettes. She started smoking about 29 years ago. She has a 5 pack-year smoking history. She has never used smokeless tobacco. She reports that she does not drink alcohol and does not use drugs.     She is not on any long-term medications.   She has No Known Allergies.     Current Outpatient Medications on File Prior to Visit   Medication Sig    multiple vitamin Oral Chew Tab Chew 1 tablet by mouth daily.     No current facility-administered medications on file prior to visit.         REVIEW OF SYSTEMS:   Patient denies shortness of breath, denies chest pain and denies any recent fevers or chills.    Patient reports no urinary complaints and denies headaches or visual disturbances.   Patient denies any abdominal pain at this time. Patient has no new skin lesions.  Patient reports no acute back pain and reports no dizziness or headaches.   Patient reports no visual disturbances and reports hearing has been about the same.   Patient reports no recent injury or trauma.               EXAM:    BP 94/60   Pulse 84   Temp 98 °F  (36.7 °C)   Resp 12   Ht 5' 7\" (1.702 m)   Wt 148 lb (67.1 kg)   LMP 01/03/2024 (Exact Date)   SpO2 99%   BMI 23.18 kg/m²  Estimated body mass index is 23.18 kg/m² as calculated from the following:    Height as of this encounter: 5' 7\" (1.702 m).    Weight as of this encounter: 148 lb (67.1 kg).    General Appearance:  Alert, cooperative, no distress, appears stated age   Head:  Normocephalic, without obvious abnormality, atraumatic   Eyes:  conjunctiva/cornea is not erythematous.        Nose: No nasal drainage.    Throat: No erythema    Neck: Supple, symmetrical, trachea midline, and normal ROM  thyroid: no obvious nodules   Back:   Symmetric, no curvature, ROM normal, no CVA tenderness   Lungs:   Clear to auscultation bilaterally, respirations unlabored   Chest Wall:  No tenderness or deformity   Heart:  Regular rate and rhythm, S1, S2 normal, no murmur,   Abdomen:   Soft, non-tender, bowel sounds active. No hernia.    Genitalia:     Rectal:     Extremities: Extremities normal, atraumatic, no cyanosis or edema   Pulses: 2+ and symmetric   Skin: Skin color, texture, turgor normal, no new rashes    Lymph nodes: No obvious cervical adenopathy.    Neurologic and psych: Normal speech, Alert and oriented x 3.   Normal mood, normal insight and judgment.            ASSESSMENT AND PLAN:   Diagnoses and all orders for this visit:    Routine general medical examination at a health care facility  -     CBC With Differential With Platelet; Future  -     Comp Metabolic Panel (14); Future  -     Lipid Panel; Future  -     TSH W Reflex To Free T4; Future       -wellness visit was done      Carlos Murphy MD, 2/4/2024, 3:52 PM     Note to patient: The 21st Century Cures Act makes medical notes like these available to patients in the interest of transparency. However, this is a medical document intended as peer to peer communication. It is written in medical language and may contain abbreviations or verbiage that are  unfamiliar. It may appear blunt or direct. Medical documents are intended to carry relevant information, facts as evident, and the clinical opinion of the practitioner who signs the document.

## 2024-04-05 NOTE — PROGRESS NOTES
[facility]  Post-Partum Caesarean Section Progress Note    Ying Radford Patient Status:  Inpatient    1980 MRN [id number]   [facility] 1SW-J Attending Meeta Molina MD   Hosp Day # 1 PCP Divina Viera MD     SUBJ Scheduled

## 2024-08-26 ENCOUNTER — PATIENT MESSAGE (OUTPATIENT)
Dept: FAMILY MEDICINE CLINIC | Facility: CLINIC | Age: 44
End: 2024-08-26

## 2024-08-27 NOTE — TELEPHONE ENCOUNTER
From: Sasha Thurston  To: Carlos Murphy  Sent: 8/26/2024 6:33 PM CDT  Subject: Appointment Regarding Anal Fissure: Dr. Murphy or Specialist?    Hello.   I am writing to ask if I should schedule an appointment with Dr. Murphy to discuss a concern I have been experiencing - I believe I may have an anal fissure and over-the-counter ointments are not helping. OR should I schedule an appointment with a specialist? If a specialist would be more appropriate for my condition, if Dr. Murphy could refer me to one, I would greatly appreciate it.      Thank you for your assistance.

## 2024-08-27 NOTE — TELEPHONE ENCOUNTER
Patient c/o anal fissure  Asking if she should see you or a specialist    Please advise  If you want her to see a specialist, who would you like her to see?

## 2024-10-15 ENCOUNTER — NURSE TRIAGE (OUTPATIENT)
Dept: FAMILY MEDICINE CLINIC | Facility: CLINIC | Age: 44
End: 2024-10-15

## 2024-10-15 NOTE — TELEPHONE ENCOUNTER
Action Requested: Summary for Provider     []  Critical Lab, Recommendations Needed  [] Need Additional Advice  [x]   FYI    []   Need Orders  [] Need Medications Sent to Pharmacy  []  Other     SUMMARY: Patient called to follow up on MyChart scheduled appt that mentioned \"anal fissure or a bleeding hemorrhoid\". Patient reported that approx 2 months ago she has noticed bright red blood from her rectum. Patient denies any trauma, dark red blood, N/V/D, abdominal pain, or dizziness. Patient states she has a history of hemorrhoids. She knows that without regular stool softeners, hard stool tends to cause bleeding, which patient reports can last approx 1 week before stopping/healing itself. Patient agreed to schedule earlier appt to be seen in office for evaluation (10/31 soonest available). Patient believes issue to be an external hemorrhoid or an anal fissure.    Reason for call: Anal/Rectal Problem  Onset: 2 months    Future Appointments   Date Time Provider Department Center   10/31/2024 10:40 AM Carlos Murphy MD [facility]       Reason for Disposition   MODERATE rectal bleeding (small blood clots, passing blood without stool, or toilet water turns red)    Protocols used: Rectal Bleeding-A-OH

## 2024-10-15 NOTE — TELEPHONE ENCOUNTER
11/14/24 Appt made via [employer] with visit notes stating Anal fissure or a bleeding hemorrhoid.     Please advice

## 2024-10-31 ENCOUNTER — PATIENT MESSAGE (OUTPATIENT)
Dept: FAMILY MEDICINE CLINIC | Facility: CLINIC | Age: 44
End: 2024-10-31

## 2024-10-31 ENCOUNTER — OFFICE VISIT (OUTPATIENT)
Dept: FAMILY MEDICINE CLINIC | Facility: CLINIC | Age: 44
End: 2024-10-31
Payer: COMMERCIAL

## 2024-10-31 VITALS
BODY MASS INDEX: 23.39 KG/M2 | SYSTOLIC BLOOD PRESSURE: 106 MMHG | HEART RATE: 84 BPM | WEIGHT: 149 LBS | HEIGHT: 67 IN | DIASTOLIC BLOOD PRESSURE: 62 MMHG | RESPIRATION RATE: 14 BRPM | OXYGEN SATURATION: 99 %

## 2024-10-31 DIAGNOSIS — K64.9 HEMORRHOIDS, UNSPECIFIED HEMORRHOID TYPE: Primary | ICD-10-CM

## 2024-10-31 DIAGNOSIS — K59.00 CONSTIPATION, UNSPECIFIED CONSTIPATION TYPE: ICD-10-CM

## 2024-10-31 PROCEDURE — 99214 OFFICE O/P EST MOD 30 MIN: CPT | Performed by: FAMILY MEDICINE

## 2024-10-31 PROCEDURE — 3078F DIAST BP <80 MM HG: CPT | Performed by: FAMILY MEDICINE

## 2024-10-31 PROCEDURE — 3074F SYST BP LT 130 MM HG: CPT | Performed by: FAMILY MEDICINE

## 2024-10-31 PROCEDURE — 3008F BODY MASS INDEX DOCD: CPT | Performed by: FAMILY MEDICINE

## 2024-10-31 RX ORDER — PRAMOXINE HYDROCHLORIDE 10 MG/G
1 AEROSOL, FOAM TOPICAL DAILY PRN
Qty: 15 G | Refills: 2 | Status: SHIPPED | OUTPATIENT
Start: 2024-10-31 | End: 2024-11-01

## 2024-10-31 NOTE — PROGRESS NOTES
HPI:    Sasha Thurston is a 43 year old female who presents for Hemorrhoids (Possible external hemorrhoid or anal fissure per pt. For 2 1/2 months. )     Presenting for follow up on external and questionable internal hemorrhoids.   Patient reports symptoms started about 10 years ago after pregnancy, with s/s worse over the past year, diet could be better, has intermittent constipation, took stool softner, high fiber diet, and water intake could be better.   Patient would like discuss further options and treatment for her symptoms.     Past History:   She  has a past medical history of Calculus of kidney (), Histoplasmosis, unspecified (), Kidney stones, Visual impairment, and Wrist fracture, right.   She  has a past surgical history that includes colposcopy, cervix w/upper adjacent vagina; w/biopsy(s), cervix (); d & c (); ;  (); and other surgical history ().   Her family history includes Asthma in her son; Cancer (age of onset: 71) in her father; Dementia in her maternal grandmother; Heart Disorder in her father, maternal grandfather, and mother; Hypertension in her mother; Lipids in her mother; No Known Problems in her sister; Obesity in her sister.   She  reports that she quit smoking about 19 years ago. Her smoking use included cigarettes. She started smoking about 29 years ago. She has a 5.2 pack-year smoking history. She has never used smokeless tobacco. She reports that she does not drink alcohol and does not use drugs.     She is not on any long-term medications.   She has No Known Allergies.     Current Outpatient Medications on File Prior to Visit   Medication Sig    multiple vitamin Oral Chew Tab Chew 1 tablet by mouth daily.     No current facility-administered medications on file prior to visit.         REVIEW OF SYSTEMS:   Patient denies shortness of breath, denies chest pain and denies any recent fevers or chills.    Patient reports no urinary complaints  and denies headaches or visual disturbances.   Patient denies any abdominal pain at this time. Patient has no new skin lesions.  Patient reports no acute back pain and reports no dizziness or headaches.   Patient reports no visual disturbances and reports hearing has been about the same.   Patient reports no recent injury or trauma.               EXAM:    /62   Pulse 84   Resp 14   Ht 5' 7\" (1.702 m)   Wt 149 lb (67.6 kg)   LMP 01/03/2024 (Exact Date)   SpO2 99%   BMI 23.34 kg/m²  Estimated body mass index is 23.34 kg/m² as calculated from the following:    Height as of this encounter: 5' 7\" (1.702 m).    Weight as of this encounter: 149 lb (67.6 kg).    General Appearance:  Alert, cooperative, no distress, appears stated age   Head:  Normocephalic, without obvious abnormality, atraumatic   Eyes:  conjunctiva/cornea is not erythematous.        Nose: No nasal drainage.    Throat: No erythema    Neck: Supple, symmetrical, trachea midline, and normal ROM  thyroid: no obvious nodules   Back:   Symmetric, no curvature, ROM normal, no CVA tenderness   Lungs:   Clear to auscultation bilaterally, respirations unlabored   Chest Wall:  No tenderness or deformity   Heart:  Regular rate and rhythm, S1, S2 normal, no murmur,   Abdomen:   Soft, non-tender, bowel sounds active. No hernia.    Genitalia:     Rectal:     Extremities: Extremities normal, atraumatic, no cyanosis or edema   Pulses: 2+ and symmetric   Skin: Skin color, texture, turgor normal, no new rashes    Lymph nodes: No obvious cervical adenopathy.    Neurologic and psych: Normal speech, Alert and oriented x 3.   Normal mood, normal insight and judgment.                    ASSESSMENT AND PLAN:   Diagnoses and all orders for this visit:    Hemorrhoids, unspecified hemorrhoid type  -     Surgery Referral - In Network  -     pramoxine perianal (PROCTOFOAM) 1 % External Foam; Place 1 applicator rectally daily as needed for Hemorrhoids.    Constipation,  unspecified constipation type  -     Surgery Referral - In Network  -     pramoxine perianal (PROCTOFOAM) 1 % External Foam; Place 1 applicator rectally daily as needed for Hemorrhoids.       -high dose fiber to add to her diet, prn topical cream, increase water to 2-3 liters per days, prn topical Rx as above sent over.   -would prefer surgical options of excision at this point, will refer to Gen Surgery    Follow up when due for wellness visit.     Carlos Murphy MD, 10/31/2024, 2:19 PM     Note to patient: The 21st Century Cures Act makes medical notes like these available to patients in the interest of transparency. However, this is a medical document intended as peer to peer communication. It is written in medical language and may contain abbreviations or verbiage that are unfamiliar. It may appear blunt or direct. Medical documents are intended to carry relevant information, facts as evident, and the clinical opinion of the practitioner who signs the document.

## 2024-11-01 DIAGNOSIS — K59.00 CONSTIPATION, UNSPECIFIED CONSTIPATION TYPE: ICD-10-CM

## 2024-11-01 DIAGNOSIS — K64.9 HEMORRHOIDS, UNSPECIFIED HEMORRHOID TYPE: ICD-10-CM

## 2024-11-01 RX ORDER — PRAMOXINE HYDROCHLORIDE 10 MG/G
1 AEROSOL, FOAM TOPICAL DAILY PRN
Qty: 15 G | Refills: 2 | Status: SHIPPED | OUTPATIENT
Start: 2024-11-01

## 2024-11-01 NOTE — TELEPHONE ENCOUNTER
This medication is on backorder please advise, thank you.       LF- 10/31/2024  LOV- 10/31/2024

## 2024-11-02 NOTE — TELEPHONE ENCOUNTER
Patient states [facility] should have sent a form over  Her insurance does not cover Pramoxine Hcl 1% Foam     Alternative is hydrocortisone ac suppositories or pramoxine/hydrocortisone cream.

## 2024-11-03 RX ORDER — HYDROCORTISONE 25 MG/G
1 CREAM TOPICAL DAILY PRN
Qty: 28 G | Refills: 1 | Status: SHIPPED | OUTPATIENT
Start: 2024-11-03 | End: 2024-11-17

## 2024-12-03 ENCOUNTER — OFFICE VISIT (OUTPATIENT)
Facility: LOCATION | Age: 44
End: 2024-12-03
Payer: COMMERCIAL

## 2024-12-03 VITALS
OXYGEN SATURATION: 99 % | DIASTOLIC BLOOD PRESSURE: 79 MMHG | TEMPERATURE: 97 F | HEART RATE: 72 BPM | SYSTOLIC BLOOD PRESSURE: 106 MMHG

## 2024-12-03 DIAGNOSIS — K64.8 INTERNAL HEMORRHOIDS WITH COMPLICATION: Primary | ICD-10-CM

## 2024-12-03 DIAGNOSIS — K92.1 HEMATOCHEZIA: ICD-10-CM

## 2024-12-03 PROCEDURE — 99204 OFFICE O/P NEW MOD 45 MIN: CPT | Performed by: SURGERY

## 2024-12-03 PROCEDURE — 3078F DIAST BP <80 MM HG: CPT | Performed by: SURGERY

## 2024-12-03 PROCEDURE — 3074F SYST BP LT 130 MM HG: CPT | Performed by: SURGERY

## 2024-12-03 PROCEDURE — 46600 DIAGNOSTIC ANOSCOPY SPX: CPT | Performed by: SURGERY

## 2024-12-03 NOTE — H&P
New Patient Visit Note       Active Problems      1. Internal hemorrhoids with complication    2. Hematochezia        Chief Complaint   Chief Complaint   Patient presents with    Hemorrhoids     NP-Hemorrhoids, unspecified hemorrhoid type, Constipation, unspecified constipation type       History of Present Illness     The patient presents to request of her primary care physician for evaluation of longstanding rectal bleeding.  Patient has known history of hemorrhoids which have been present for over 10 years since her pregnancies.  The patient has, over time, experienced intermittent episodes of variable volume bleeding per rectum.  Recently she also had experienced pain with bowel movements.  The patient was treated with topical medication by her primary care physician but now she wishes definitive management of this recurrent issue.    The patient denies fever, chills, chest pain, shortness of breath, dyspnea. The patient also denies hematemesis, melena, or hematochezia. The patient denies change in bowel or bladder habits. There is no complaint of hematuria or dysuria.    I discussed the risk, benefits, alternatives of surgery.  I discussed the anticipated postoperative recovery including dietary, activity, exercise, and lifestyle recommendations.  The patient's questions regarding surgical procedure were answered and the patient voiced understanding of the care plan.    Allergies  Sasha has No Known Allergies.    Past Medical / Surgical / Social / Family History    The past medical and past surgical history have been reviewed by me today.    Past Medical History:    Calculus of kidney    Histoplasmosis, unspecified    lung    Kidney stones    Visual impairment    glasses    Wrist fracture, right    about age 13     Past Surgical History:   Procedure Laterality Date          Colposcopy, cervix w/upper adjacent vagina; w/biopsy(s), cervix  2006    benign    D & c      EAB          Other  surgical history  2017    [facility]       The family history and social history have been reviewed by me today.    Family History   Problem Relation Age of Onset    Heart Disorder Father         cardiac arrythnmia    Cancer Father 71        Pancreatic Cancer    Lipids Mother     Hypertension Mother     Heart Disorder Mother         Arrythmia/pacemaker    Obesity Sister     No Known Problems Sister     Heart Disorder Maternal Grandfather     Dementia Maternal Grandmother     Asthma Son     Breast Cancer Neg     Ovarian Cancer Neg     Colon Cancer Neg      Social History     Socioeconomic History    Marital status:    Tobacco Use    Smoking status: Former     Current packs/day: 0.00     Average packs/day: 0.5 packs/day for 10.4 years (5.2 ttl pk-yrs)     Types: Cigarettes     Start date: 1995     Quit date: 2005     Years since quittin.5    Smokeless tobacco: Never   Vaping Use    Vaping status: Never Used   Substance and Sexual Activity    Alcohol use: No    Drug use: No    Sexual activity: Yes     Partners: Male     Comment: [facility]   Other Topics Concern    Caffeine Concern No    Exercise No    Seat Belt Yes    Special Diet No    Stress Concern No    Weight Concern No        Current Outpatient Medications:     pramoxine perianal (PROCTOFOAM) 1 % External Foam, Place 1 applicator rectally daily as needed for Hemorrhoids., Disp: 15 g, Rfl: 2    multiple vitamin Oral Chew Tab, Chew 1 tablet by mouth daily., Disp: , Rfl:       Review of Systems  The Review of Systems has been reviewed by me during today.  Review of Systems   Constitutional: Negative.    HENT: Negative.     Eyes: Negative.    Respiratory: Negative.     Cardiovascular: Negative.    Gastrointestinal: Negative.    Genitourinary: Negative.    Musculoskeletal: Negative.    Skin: Negative.    Neurological: Negative.    Psychiatric/Behavioral: Negative.         Physical Findings   /79   Pulse 72   Temp 97.2 °F (36.2 °C)   LMP  01/03/2024 (Exact Date)   SpO2 99%   Physical Exam  Vitals and nursing note reviewed. Exam conducted with a chaperone present.   Constitutional:       General: She is not in acute distress.     Appearance: She is well-developed.   HENT:      Head: Normocephalic and atraumatic.   Eyes:      General: No scleral icterus.  Neck:      Trachea: No tracheal deviation.   Cardiovascular:      Rate and Rhythm: Normal rate and regular rhythm.      Heart sounds: S1 normal and S2 normal. No murmur heard.  Pulmonary:      Effort: No tachypnea, accessory muscle usage or respiratory distress.      Breath sounds: No decreased breath sounds, wheezing, rhonchi or rales.   Genitourinary:     Exam position: Prone.      Rectum: Internal hemorrhoid present. No mass, tenderness, anal fissure or external hemorrhoid. Normal anal tone.      Comments: No Rectocele  No Rectovaginal Fistula  Anal Sphincter Intact  No Pruritis Ani  No Lichenification  No Abscess  No Fistula in ano  No Anterior Fissure  No Posterior Fissure    See Procedures:  Anoscopy reveals multiple prominent internal hemorrhoids.  No other lesions in the anorectal canal within view of the anoscope.  Skin:     General: Skin is warm and dry.   Neurological:      Mental Status: She is alert and oriented to person, place, and time.   Psychiatric:         Speech: Speech normal.         Behavior: Behavior normal. Behavior is cooperative.         Thought Content: Thought content normal.         Judgment: Judgment normal.             Assessment   1. Internal hemorrhoids with complication    2. Hematochezia          Plan     The patient will be scheduled for rubber band ligation and phenol injection of internal hemorrhoids as an outpatient.    Dietary fiber supplementation as well as activity exercise and lifestyle modification was discussed.    The zurdo-operative care plan was discussed with the patient, who voices understanding.  Activity and lifting recommendations were discussed  in length.     The risks, benefits, and alternatives to the procedure were explained to the patient.  The risks explained include, but are not limited to, bleeding, infection, pain wound complications, recurrence, incorrect diagnosis, injury to adjacent organs and structures. We also discussed the possibile need for further therapeutic, diagnostic, or surgical intervention.  The patient voiced understanding, and after all questions were answered to the patient's satisfaction, the patient provided willing and informed consent to proceed.     No orders of the defined types were placed in this encounter.      Imaging & Referrals   None    Follow Up  No follow-ups on file.    Mateo Walton MD

## 2024-12-05 ENCOUNTER — TELEPHONE (OUTPATIENT)
Facility: CLINIC | Age: 44
End: 2024-12-05

## 2025-01-16 ENCOUNTER — OFFICE VISIT (OUTPATIENT)
Facility: LOCATION | Age: 45
End: 2025-01-16
Payer: COMMERCIAL

## 2025-01-16 VITALS
SYSTOLIC BLOOD PRESSURE: 113 MMHG | HEART RATE: 72 BPM | TEMPERATURE: 97 F | OXYGEN SATURATION: 100 % | DIASTOLIC BLOOD PRESSURE: 79 MMHG

## 2025-01-16 DIAGNOSIS — K64.8 INTERNAL HEMORRHOIDS WITH COMPLICATION: Primary | ICD-10-CM

## 2025-01-16 PROCEDURE — 46221 LIGATION OF HEMORRHOID(S): CPT | Performed by: SURGERY

## 2025-01-16 PROCEDURE — 3074F SYST BP LT 130 MM HG: CPT | Performed by: SURGERY

## 2025-01-16 PROCEDURE — 3078F DIAST BP <80 MM HG: CPT | Performed by: SURGERY

## 2025-01-16 NOTE — PROGRESS NOTES
Follow Up Visit Note       Active Problems      1. Internal hemorrhoids with complication          Chief Complaint   Chief Complaint   Patient presents with    Procedure     EP- 1st Banding         History of Present Illness    The patient presents for rubber band ligation of internal hemorrhoids.  The patient has no new complaints since our last encounter.     Allergies  Sasha has No Known Allergies.    Past Medical / Surgical / Social / Family History    The past medical and past surgical history have been reviewed by me today.    Past Medical History:    Calculus of kidney    Histoplasmosis, unspecified    lung    Kidney stones    Visual impairment    glasses    Wrist fracture, right    about age 13     Past Surgical History:   Procedure Laterality Date          Colposcopy, cervix w/upper adjacent vagina; w/biopsy(s), cervix  2006    benign    D & c      EAB          Other surgical history  2017    [facility]       The family history and social history have been reviewed by me today.    Family History   Problem Relation Age of Onset    Heart Disorder Father         cardiac arrythnmia    Cancer Father 71        Pancreatic Cancer    Lipids Mother     Hypertension Mother     Heart Disorder Mother         Arrythmia/pacemaker    Obesity Sister     No Known Problems Sister     Heart Disorder Maternal Grandfather     Dementia Maternal Grandmother     Asthma Son     Breast Cancer Neg     Ovarian Cancer Neg     Colon Cancer Neg      Social History     Socioeconomic History    Marital status:    Tobacco Use    Smoking status: Former     Current packs/day: 0.00     Average packs/day: 0.5 packs/day for 10.4 years (5.2 ttl pk-yrs)     Types: Cigarettes     Start date: 1995     Quit date: 2005     Years since quittin.6    Smokeless tobacco: Never   Vaping Use    Vaping status: Never Used   Substance and Sexual Activity    Alcohol use: No    Drug use: No    Sexual activity: Yes      Partners: Male     Comment: [facility]   Other Topics Concern    Caffeine Concern No    Exercise No    Seat Belt Yes    Special Diet No    Stress Concern No    Weight Concern No      Current Outpatient Medications   Medication Sig Dispense Refill    pramoxine perianal (PROCTOFOAM) 1 % External Foam Place 1 applicator rectally daily as needed for Hemorrhoids. 15 g 2    multiple vitamin Oral Chew Tab Chew 1 tablet by mouth daily.          Review of Systems  The Review of Systems has been reviewed by me during today.  Review of Systems     Physical Findings   /79   Pulse 72   Temp 96.7 °F (35.9 °C)   LMP 01/03/2024 (Exact Date)   SpO2 100%   Pre op diagnosis: Internal Hemorrhoids    Post op diagnosis: Same    Procedure: Anoscopy with O-ring Rubber Band Ligation of Internal Hemorrhoids    History of present illness:   This patient has internal hemorrhoids that are symptomatic.    Operative findings:   The internal hemorrhoid was successfully ligated in the standard fashion with an O-ring ligator.      Operative summary:  The patient was draped and exposed in the usual fashion.    A medical assistant was present at all times.    External visualization of the perineum and gluteal cleft was performed.    Digital exam was performed with a well lubricated examining finger.    Diagnostic Anoscopy was performed with lubrication.    The anal canal was well visualized.    An internal hemorrhoid was identified and well visualized.    The internal hemorrhoid was grasped well above the dentate line with the grasper.    The internal hemorrhoid was pulled within the O-ring ligator.    The O-ring ligator was fired without complication.    A 5% phenol solution was injected into the hemorrhoid and at its base.    The patient tolerated the procedure and was observed in our office for at least 5 minutes prior to discharge.    All findings are listed in the physical exam section of this note.           Assessment   1. Internal  hemorrhoids with complication        Plan     Successful treatment of internal hemorrhoid of the right posterior lateral position by rubber band ligation and phenol injection.    The care plan is discussed with the patient who voiced understanding.    Dietary, activity, and exercise recommendations were discussed with the patient.    The patient is encouraged to avoid straining, continue dietary fiber supplementation, stool softeners, and adequate hydration.    Follow-up in 2 weeks for continued treatment of internal hemorrhoids.    The patient was provided ample opportunity to ask questions.  All of the patient's questions were answered in detail.  The patient voiced understanding of the care plan.       No orders of the defined types were placed in this encounter.      Imaging & Referrals   None    Follow Up  No follow-ups on file.    Mateo Walton MD

## 2025-01-16 NOTE — PROGRESS NOTES
Follow Up Visit Note       Active Problems      No diagnosis found.      Chief Complaint   Chief Complaint   Patient presents with    Procedure     EP- 1st Banding         History of Present Illness        Allergies  Sasha has No Known Allergies.    Past Medical / Surgical / Social / Family History    The past medical and past surgical history have been reviewed by me today.    Past Medical History:    Calculus of kidney    Histoplasmosis, unspecified    lung    Kidney stones    Visual impairment    glasses    Wrist fracture, right    about age 13     Past Surgical History:   Procedure Laterality Date          Colposcopy, cervix w/upper adjacent vagina; w/biopsy(s), cervix      benign    D & c      EAB          Other surgical history  2017    [facility]       The family history and social history have been reviewed by me today.    Family History   Problem Relation Age of Onset    Heart Disorder Father         cardiac arrythnmia    Cancer Father 71        Pancreatic Cancer    Lipids Mother     Hypertension Mother     Heart Disorder Mother         Arrythmia/pacemaker    Obesity Sister     No Known Problems Sister     Heart Disorder Maternal Grandfather     Dementia Maternal Grandmother     Asthma Son     Breast Cancer Neg     Ovarian Cancer Neg     Colon Cancer Neg      Social History     Socioeconomic History    Marital status:    Tobacco Use    Smoking status: Former     Current packs/day: 0.00     Average packs/day: 0.5 packs/day for 10.4 years (5.2 ttl pk-yrs)     Types: Cigarettes     Start date: 1995     Quit date: 2005     Years since quittin.6    Smokeless tobacco: Never   Vaping Use    Vaping status: Never Used   Substance and Sexual Activity    Alcohol use: No    Drug use: No    Sexual activity: Yes     Partners: Male     Comment: [facility]   Other Topics Concern    Caffeine Concern No    Exercise No    Seat Belt Yes    Special Diet No    Stress Concern No    Weight  Concern No        Current Outpatient Medications:     pramoxine perianal (PROCTOFOAM) 1 % External Foam, Place 1 applicator rectally daily as needed for Hemorrhoids., Disp: 15 g, Rfl: 2    multiple vitamin Oral Chew Tab, Chew 1 tablet by mouth daily., Disp: , Rfl:      Review of Systems  The Review of Systems has been reviewed by me during today.  Review of Systems   Constitutional: Negative.    Respiratory: Negative.     Cardiovascular: Negative.    Gastrointestinal: Negative.    Skin: Negative.    Neurological: Negative.         Physical Findings   [facility] 01/03/2024 (Exact Date)   Physical Exam     Assessment   No diagnosis found.    Plan        No orders of the defined types were placed in this encounter.      Imaging & Referrals   None    Follow Up  No follow-ups on file.    Mateo Walton MD

## 2025-02-06 ENCOUNTER — OFFICE VISIT (OUTPATIENT)
Facility: LOCATION | Age: 45
End: 2025-02-06
Payer: COMMERCIAL

## 2025-02-06 ENCOUNTER — OFFICE VISIT (OUTPATIENT)
Dept: FAMILY MEDICINE CLINIC | Facility: CLINIC | Age: 45
End: 2025-02-06
Payer: COMMERCIAL

## 2025-02-06 VITALS
SYSTOLIC BLOOD PRESSURE: 102 MMHG | OXYGEN SATURATION: 94 % | RESPIRATION RATE: 14 BRPM | BODY MASS INDEX: 24.43 KG/M2 | WEIGHT: 152 LBS | HEIGHT: 66 IN | HEART RATE: 84 BPM | TEMPERATURE: 98 F | DIASTOLIC BLOOD PRESSURE: 82 MMHG

## 2025-02-06 VITALS
SYSTOLIC BLOOD PRESSURE: 115 MMHG | HEART RATE: 77 BPM | DIASTOLIC BLOOD PRESSURE: 73 MMHG | TEMPERATURE: 97 F | OXYGEN SATURATION: 94 %

## 2025-02-06 DIAGNOSIS — E78.2 MIXED HYPERLIPIDEMIA: ICD-10-CM

## 2025-02-06 DIAGNOSIS — Z12.31 SCREENING MAMMOGRAM FOR BREAST CANCER: ICD-10-CM

## 2025-02-06 DIAGNOSIS — Z00.00 ROUTINE GENERAL MEDICAL EXAMINATION AT A HEALTH CARE FACILITY: Primary | ICD-10-CM

## 2025-02-06 DIAGNOSIS — Z12.11 COLON CANCER SCREENING: ICD-10-CM

## 2025-02-06 DIAGNOSIS — K64.8 INTERNAL HEMORRHOIDS WITH COMPLICATION: Primary | ICD-10-CM

## 2025-02-06 PROBLEM — K92.1 HEMATOCHEZIA: Status: RESOLVED | Noted: 2024-12-03 | Resolved: 2025-02-06

## 2025-02-06 PROCEDURE — 46221 LIGATION OF HEMORRHOID(S): CPT | Performed by: SURGERY

## 2025-02-06 PROCEDURE — 3078F DIAST BP <80 MM HG: CPT | Performed by: SURGERY

## 2025-02-06 PROCEDURE — 3074F SYST BP LT 130 MM HG: CPT | Performed by: SURGERY

## 2025-02-07 NOTE — PROGRESS NOTES
Subjective:   Sasha Thurston is a 44 year old female who presents for Physical (Brought in lab work /The following individual(s) verbally consented to be recorded using ambient AI listening technology and understand that they can each withdraw their consent to this listening technology at any point by asking the clinician to turn off or pause the recording: /Reviewed Preventative/Wellness form with patient.//)       History/Other:   History of Present Illness  The patient, with a history of elevated cholesterol, presents for a routine follow-up. She reports a recent increase in cholesterol levels, which she attributes to increased cheese consumption. She also mentions a love for carbohydrates, including bread and rice. She has recently undergone a hemorrhoid bone pain procedure and has been advised to increase her fiber intake.    The patient also discusses her menstrual cycle, which remains regular. She expresses curiosity about the possibility of entering perimenopause, but does not report any symptoms indicative of this stage.    In addition, the patient mentions a recent blood test that showed a slightly low B12 level. She reports consuming chicken and pork, but not red meat. She does not currently take any supplements.   Chief Complaint Reviewed and Verified  Nursing Notes Reviewed and   Verified  Tobacco Reviewed  Allergies Reviewed  Medications Reviewed    Problem List Reviewed  Medical History Reviewed  Surgical History   Reviewed  OB Status Reviewed  Family History Reviewed         Tobacco:  She smoked tobacco in the past but quit greater than 12 months ago.  Social History     Tobacco Use   Smoking Status Former    Current packs/day: 0.00    Average packs/day: 0.5 packs/day for 10.4 years (5.2 ttl pk-yrs)    Types: Cigarettes    Start date: 1995    Quit date: 2005    Years since quittin.6   Smokeless Tobacco Never        Current Outpatient Medications   Medication Sig  Dispense Refill    multiple vitamin Oral Chew Tab Chew 1 tablet by mouth daily.         PHQ-2 SCORE: 0  , done 2/6/2025          Review of Systems:  Pertinent items are noted in HPI.  A comprehensive review of systems was negative.      Objective:   /82   Pulse 84   Temp 98.1 °F (36.7 °C)   Resp 14   Ht 5' 6\" (1.676 m)   Wt 152 lb (68.9 kg)   LMP 01/07/2025 (Exact Date)   SpO2 94%   Breastfeeding No   BMI 24.53 kg/m²  Estimated body mass index is 24.53 kg/m² as calculated from the following:    Height as of this encounter: 5' 6\" (1.676 m).    Weight as of this encounter: 152 lb (68.9 kg).  Results  LABS  Cholesterol: 232  B12: 232 (09/28/2024)    DIAGNOSTIC  Colorectal cancer screening: Negative     Physical Exam    Physical Exam  Vitals and nursing note reviewed.   Constitutional:       General: She is not in acute distress.     Appearance: Normal appearance. She is not ill-appearing.   HENT:      Head: Normocephalic and atraumatic.      Nose: No congestion or rhinorrhea.   Eyes:      Conjunctiva/sclera: Conjunctivae normal.   Neck:      Thyroid: No thyromegaly.      Vascular: No JVD.      Trachea: No tracheal deviation.   Cardiovascular:      Rate and Rhythm: Normal rate and regular rhythm.   Pulmonary:      Effort: Pulmonary effort is normal.      Breath sounds: Normal breath sounds.   Abdominal:      General: Abdomen is flat.   Musculoskeletal:      Cervical back: Normal range of motion.      Right lower leg: No edema.      Left lower leg: No edema.   Skin:     General: Skin is warm and dry.   Neurological:      Mental Status: She is alert and oriented to person, place, and time.      Cranial Nerves: No cranial nerve deficit.      Motor: No abnormal muscle tone.      Gait: Gait is intact. Gait normal.   Psychiatric:         Mood and Affect: Mood and affect normal.         Behavior: Behavior normal.         Thought Content: Thought content normal.         Cognition and Memory: Memory normal.          Judgment: Judgment normal.          Assessment & Plan:   1. Routine general medical examination at a health care facility (Primary)  -     CBC With Differential With Platelet; Future; Expected date: 02/06/2025  -     Comp Metabolic Panel (14); Future; Expected date: 02/06/2025  -     Lipid Panel; Future; Expected date: 02/06/2025  -     TSH W Reflex To Free T4; Future; Expected date: 02/06/2025  2. Screening mammogram for breast cancer  -     [facility] 2D+3D SCREENING BILAT (CPT=77067/94209); Future; Expected date: 02/06/2025  3. Colon cancer screening  -     Surgery Referral - In Network  4. Mixed hyperlipidemia    Assessment & Plan  Hyperlipidemia  Elevated cholesterol levels, possibly related to dietary intake of cheese. Patient is motivated to improve diet over the next year.  -Encouraged patient to monitor dietary intake, particularly cheese and carbohydrates.  -Plan to reassess cholesterol levels at next annual visit.    Vitamin B12 Deficiency  B12 levels on the lower end of the normal range. Patient is not vegetarian and consumes chicken and pork.  -Recommended increasing protein intake, particularly red meat, or consider B12 supplementation.  -Plan to monitor B12 levels.    Family History of Heart Disease  Patient's mother has a history of heart disease, including myocarditis, high blood pressure, and pacemaker for sick sinus syndrome.  -Plan to conduct a heart screening (calcium score) at next annual visit when patient turns 45.    Colon Cancer Screening  Patient is approaching the recommended age for colonoscopy screening.  -Provided referral for colonoscopy to be scheduled during patient's winter break in December 2025.    Thyroid Function  Patient's mother has Hashimoto's thyroiditis. Patient's thyroid levels were checked and are normal.  -Plan to monitor thyroid function due to family history of Hashimoto's.      Follow up in 1 year, sooner prn.       Carlos Murphy MD, 2/6/2025, 10:14 PM

## 2025-02-07 NOTE — PROGRESS NOTES
Follow Up Visit Note       Active Problems      1. Internal hemorrhoids with complication          Chief Complaint   Chief Complaint   Patient presents with    Procedure     2nd banding         History of Present Illness    The patient presents for rubber band ligation of internal hemorrhoids.  The patient has no new complaints since our last encounter.     Allergies  Sasha has No Known Allergies.    Past Medical / Surgical / Social / Family History    The past medical and past surgical history have been reviewed by me today.    Past Medical History:    Calculus of kidney    Histoplasmosis, unspecified    lung    Kidney stones    Visual impairment    glasses    Wrist fracture, right    about age 13     Past Surgical History:   Procedure Laterality Date          Colposcopy, cervix w/upper adjacent vagina; w/biopsy(s), cervix  2006    benign    D & c      EAB          Other surgical history  2017    [facility]       The family history and social history have been reviewed by me today.    Family History   Problem Relation Age of Onset    Heart Disorder Father         cardiac arrythnmia    Cancer Father 71        Pancreatic Cancer    Lipids Mother     Hypertension Mother     Heart Disorder Mother         Arrythmia/pacemaker    Obesity Sister     No Known Problems Sister     Heart Disorder Maternal Grandfather     Dementia Maternal Grandmother     Asthma Son     Breast Cancer Neg     Ovarian Cancer Neg     Colon Cancer Neg      Social History     Socioeconomic History    Marital status:    Tobacco Use    Smoking status: Former     Current packs/day: 0.00     Average packs/day: 0.5 packs/day for 10.4 years (5.2 ttl pk-yrs)     Types: Cigarettes     Start date: 1995     Quit date: 2005     Years since quittin.7    Smokeless tobacco: Never   Vaping Use    Vaping status: Never Used   Substance and Sexual Activity    Alcohol use: No    Drug use: No    Sexual activity: Yes     Partners:  Male     Comment: [facility]   Other Topics Concern    Caffeine Concern No    Exercise No    Seat Belt Yes    Special Diet No    Stress Concern No    Weight Concern No      Current Outpatient Medications   Medication Sig Dispense Refill    multiple vitamin Oral Chew Tab Chew 1 tablet by mouth daily.          Review of Systems  The Review of Systems has been reviewed by me during today.  Review of Systems     Physical Findings   /73   Pulse 77   Temp 97.3 °F (36.3 °C)   SpO2 94%   Pre op diagnosis: Internal Hemorrhoids    Post op diagnosis: Same    Procedure: Anoscopy with O-ring Rubber Band Ligation of Internal Hemorrhoids    History of present illness:   This patient has internal hemorrhoids that are symptomatic.    Operative findings:   The internal hemorrhoid was successfully ligated in the standard fashion with an O-ring ligator.      Operative summary:  The patient was draped and exposed in the usual fashion.    A medical assistant was present at all times.    External visualization of the perineum and gluteal cleft was performed.    Digital exam was performed with a well lubricated examining finger.    Diagnostic Anoscopy was performed with lubrication.    The anal canal was well visualized.    An internal hemorrhoid was identified and well visualized.    The internal hemorrhoid was grasped well above the dentate line with the grasper.    The internal hemorrhoid was pulled within the O-ring ligator.    The O-ring ligator was fired without complication.    A 5% phenol solution was injected into the hemorrhoid and at its base.    The patient tolerated the procedure and was observed in our office for at least 5 minutes prior to discharge.    All findings are listed in the physical exam section of this note.           Assessment   1. Internal hemorrhoids with complication        Plan     Successful treatment of internal hemorrhoid  by rubber band ligation and phenol injection.    The care plan is discussed  with the patient who voiced understanding.    Dietary, activity, and exercise recommendations were discussed with the patient.    The patient is encouraged to avoid straining, continue dietary fiber supplementation, stool softeners, and adequate hydration.    Follow-up in 2 weeks for continued treatment of internal hemorrhoids.    The patient was provided ample opportunity to ask questions.  All of the patient's questions were answered in detail.  The patient voiced understanding of the care plan.       No orders of the defined types were placed in this encounter.      Imaging & Referrals   None    Follow Up  No follow-ups on file.    Mateo Walton MD

## 2025-02-08 ENCOUNTER — MED REC SCAN ONLY (OUTPATIENT)
Dept: FAMILY MEDICINE CLINIC | Facility: CLINIC | Age: 45
End: 2025-02-08

## 2025-02-20 ENCOUNTER — OFFICE VISIT (OUTPATIENT)
Facility: LOCATION | Age: 45
End: 2025-02-20
Payer: COMMERCIAL

## 2025-02-20 VITALS
OXYGEN SATURATION: 100 % | DIASTOLIC BLOOD PRESSURE: 78 MMHG | TEMPERATURE: 98 F | SYSTOLIC BLOOD PRESSURE: 108 MMHG | RESPIRATION RATE: 18 BRPM | HEART RATE: 71 BPM

## 2025-02-20 DIAGNOSIS — K60.2 ANAL FISSURE: Primary | ICD-10-CM

## 2025-02-20 DIAGNOSIS — K64.8 INTERNAL HEMORRHOIDS WITH COMPLICATION: ICD-10-CM

## 2025-02-20 PROCEDURE — 3074F SYST BP LT 130 MM HG: CPT | Performed by: SURGERY

## 2025-02-20 PROCEDURE — 3078F DIAST BP <80 MM HG: CPT | Performed by: SURGERY

## 2025-02-20 PROCEDURE — 46221 LIGATION OF HEMORRHOID(S): CPT | Performed by: SURGERY

## 2025-02-20 NOTE — PROGRESS NOTES
Follow Up Visit Note       Active Problems      1. Anal fissure    2. Internal hemorrhoids with complication          Chief Complaint   Chief Complaint   Patient presents with    Procedure     3rd banding. Pt reports she believes she has a fissure on the R side.          History of Present Illness    The patient presents for rubber band ligation of internal hemorrhoids.  T    The patient is experienced pain with defecation since our last encounter.  The pain is located in the right anterolateral aspect of the anus.  No other new complaints.      Allergies  Sasha has No Known Allergies.    Past Medical / Surgical / Social / Family History    The past medical and past surgical history have been reviewed by me today.    Past Medical History:    Calculus of kidney    Histoplasmosis, unspecified    lung    Kidney stones    Visual impairment    glasses    Wrist fracture, right    about age 13     Past Surgical History:   Procedure Laterality Date          Colposcopy, cervix w/upper adjacent vagina; w/biopsy(s), cervix  2006    benign    D & c      EAB          Other surgical history  2017    [facility]       The family history and social history have been reviewed by me today.    Family History   Problem Relation Age of Onset    Heart Disorder Father         cardiac arrythnmia    Cancer Father 71        Pancreatic Cancer    Lipids Mother     Hypertension Mother     Heart Disorder Mother         Arrythmia/pacemaker    Obesity Sister     No Known Problems Sister     Heart Disorder Maternal Grandfather     Dementia Maternal Grandmother     Asthma Son     Breast Cancer Neg     Ovarian Cancer Neg     Colon Cancer Neg      Social History     Socioeconomic History    Marital status:    Tobacco Use    Smoking status: Former     Current packs/day: 0.00     Average packs/day: 0.5 packs/day for 10.4 years (5.2 ttl pk-yrs)     Types: Cigarettes     Start date: 1995     Quit date: 2005     Years  since quittin.7    Smokeless tobacco: Never   Vaping Use    Vaping status: Never Used   Substance and Sexual Activity    Alcohol use: No    Drug use: No    Sexual activity: Yes     Partners: Male     Comment: [facility]   Other Topics Concern    Caffeine Concern No    Exercise No    Seat Belt Yes    Special Diet No    Stress Concern No    Weight Concern No      Current Outpatient Medications   Medication Sig Dispense Refill    multiple vitamin Oral Chew Tab Chew 1 tablet by mouth daily.          Review of Systems  The Review of Systems has been reviewed by me during today.  Review of Systems     Physical Findings   /78   Pulse 71   Temp 97.7 °F (36.5 °C) (Temporal)   Resp 18   LMP 2025 (Exact Date)   SpO2 100%   Pre op diagnosis: Internal Hemorrhoids    Post op diagnosis: Same    Procedure: Anoscopy with O-ring Rubber Band Ligation of Internal Hemorrhoids    History of present illness:   This patient has internal hemorrhoids that are symptomatic.    Operative findings:   The internal hemorrhoid was successfully ligated in the standard fashion with an O-ring ligator.      Operative summary:  The patient was draped and exposed in the usual fashion.    A medical assistant was present at all times.    External visualization of the perineum and gluteal cleft was performed.    Digital exam was performed with a well lubricated examining finger.    Diagnostic Anoscopy was performed with lubrication.    The anal canal was well visualized.    An internal hemorrhoid was identified and well visualized.    The internal hemorrhoid was grasped well above the dentate line with the grasper.    The internal hemorrhoid was pulled within the O-ring ligator.    The O-ring ligator was fired without complication.    A 5% phenol solution was injected into the hemorrhoid and at its base.    The patient tolerated the procedure and was observed in our office for at least 5 minutes prior to discharge.    All findings are  listed in the physical exam section of this note.           Assessment   1. Anal fissure    2. Internal hemorrhoids with complication        Plan     Successful treatment of internal hemorrhoid by rubber band ligation and phenol injection.    The patient has a new anal fissure in the right anterolateral aspect of the anus.  Topical diltiazem prescribed.  2-week follow-up to assess response to treatment.    The care plan is discussed with the patient who voiced understanding.    Dietary, activity, and exercise recommendations were discussed with the patient.    The patient is encouraged to avoid straining, continue dietary fiber supplementation, stool softeners, and adequate hydration.    Follow-up in 2 weeks for continued treatment of internal hemorrhoids.    The patient was provided ample opportunity to ask questions.  All of the patient's questions were answered in detail.  The patient voiced understanding of the care plan.       No orders of the defined types were placed in this encounter.      Imaging & Referrals   None    Follow Up  No follow-ups on file.    Mateo Walton MD

## 2025-02-20 NOTE — PROGRESS NOTES
Follow Up Visit Note       Active Problems      No diagnosis found.      Chief Complaint   Chief Complaint   Patient presents with    Procedure     3rd banding. Pt reports she believes she has a fissure on the R side.          History of Present Illness        Allergies  Sasha has No Known Allergies.    Past Medical / Surgical / Social / Family History    The past medical and past surgical history have been reviewed by me today.    Past Medical History:    Calculus of kidney    Histoplasmosis, unspecified    lung    Kidney stones    Visual impairment    glasses    Wrist fracture, right    about age 13     Past Surgical History:   Procedure Laterality Date          Colposcopy, cervix w/upper adjacent vagina; w/biopsy(s), cervix  2006    benign    D & c      EAB          Other surgical history  2017    Essure       The family history and social history have been reviewed by me today.    Family History   Problem Relation Age of Onset    Heart Disorder Father         cardiac arrythnmia    Cancer Father 71        Pancreatic Cancer    Lipids Mother     Hypertension Mother     Heart Disorder Mother         Arrythmia/pacemaker    Obesity Sister     No Known Problems Sister     Heart Disorder Maternal Grandfather     Dementia Maternal Grandmother     Asthma Son     Breast Cancer Neg     Ovarian Cancer Neg     Colon Cancer Neg      Social History     Socioeconomic History    Marital status:    Tobacco Use    Smoking status: Former     Current packs/day: 0.00     Average packs/day: 0.5 packs/day for 10.4 years (5.2 ttl pk-yrs)     Types: Cigarettes     Start date: 1995     Quit date: 2005     Years since quittin.7    Smokeless tobacco: Never   Vaping Use    Vaping status: Never Used   Substance and Sexual Activity    Alcohol use: No    Drug use: No    Sexual activity: Yes     Partners: Male     Comment: [facility]   Other Topics Concern    Caffeine Concern No    Exercise No    Seat Belt  Yes    Special Diet No    Stress Concern No    Weight Concern No        Current Outpatient Medications:     multiple vitamin Oral Chew Tab, Chew 1 tablet by mouth daily., Disp: , Rfl:      Review of Systems  The Review of Systems has been reviewed by me during today.  Review of Systems   Constitutional: Negative.    Respiratory: Negative.     Cardiovascular: Negative.    Gastrointestinal: Negative.    Skin: Negative.    Neurological: Negative.         Physical Findings   /78   Pulse 71   Temp 97.7 °F (36.5 °C) (Temporal)   Resp 18   LMP 01/07/2025 (Exact Date)   SpO2 100%   Physical Exam     Assessment   No diagnosis found.    Plan        No orders of the defined types were placed in this encounter.      Imaging & Referrals   None    Follow Up  No follow-ups on file.    Mateo Walton MD

## 2025-02-22 ENCOUNTER — HOSPITAL ENCOUNTER (OUTPATIENT)
Dept: MAMMOGRAPHY | Age: 45
Discharge: HOME | End: 2025-02-22
Attending: FAMILY MEDICINE
Payer: COMMERCIAL

## 2025-02-22 DIAGNOSIS — Z12.31 SCREENING MAMMOGRAM FOR BREAST CANCER: ICD-10-CM

## 2025-02-22 PROCEDURE — 77067 SCR MAMMO BI INCL CAD: CPT | Performed by: FAMILY MEDICINE

## 2025-02-22 PROCEDURE — 77063 BREAST TOMOSYNTHESIS BI: CPT | Performed by: FAMILY MEDICINE

## 2025-03-06 ENCOUNTER — OFFICE VISIT (OUTPATIENT)
Facility: LOCATION | Age: 45
End: 2025-03-06
Payer: COMMERCIAL

## 2025-03-06 VITALS
HEART RATE: 75 BPM | RESPIRATION RATE: 16 BRPM | DIASTOLIC BLOOD PRESSURE: 76 MMHG | TEMPERATURE: 98 F | OXYGEN SATURATION: 99 % | SYSTOLIC BLOOD PRESSURE: 113 MMHG

## 2025-03-06 DIAGNOSIS — K60.2 ANAL FISSURE: Primary | ICD-10-CM

## 2025-03-06 PROCEDURE — 3078F DIAST BP <80 MM HG: CPT | Performed by: SURGERY

## 2025-03-06 PROCEDURE — 99213 OFFICE O/P EST LOW 20 MIN: CPT | Performed by: SURGERY

## 2025-03-06 PROCEDURE — 3074F SYST BP LT 130 MM HG: CPT | Performed by: SURGERY

## 2025-03-06 NOTE — PROGRESS NOTES
Follow Up Visit Note       Active Problems      1. Anal fissure          Chief Complaint   Chief Complaint   Patient presents with    [facility]     EP- F/u anal fissure, possible banding. Pt reports diltiazem has helped a bit. Pt stopped using medication, however, and reports fissure got worse again. Pt reports it is still painful. Pt reports she tolerated the last banding well.          History of Present Illness    Patient presents for follow-up of her anal fissure.  With respect to her internal hemorrhoids, she reports no further bleeding drainage or issues.  The patient experienced relief of her anal fissure pain after 4 days of topical nifedipine treatment.  The patient discontinued but has now experienced return of her pain.  No other issues currently; no new concerns.    The patient denies fever, chills, chest pain, shortness of breath, dyspnea. The patient also denies hematemesis, melena, or hematochezia. The patient denies change in bowel or bladder habits. There is no complaint of hematuria or dysuria.    Allergies  Sasha has No Known Allergies.    Past Medical / Surgical / Social / Family History    The past medical and past surgical history have been reviewed by me today.    Past Medical History:    Calculus of kidney    Histoplasmosis, unspecified    lung    KIDNEY STONE    Kidney stones    Visual impairment    glasses    Wrist fracture, right    about age 13     Past Surgical History:   Procedure Laterality Date          Colposcopy, cervix w/upper adjacent vagina; w/biopsy(s), cervix  2006    benign    D & c      EAB      2014    Other surgical history  2017    Essure       The family history and social history have been reviewed by me today.    Family History   Problem Relation Age of Onset    Heart Disorder Father         cardiac arrythnmia    Cancer Father 71        Pancreatic Cancer    Lipids Mother     Hypertension Mother     Heart Disorder Mother          Arrythmia/pacemaker    Heart Disease Mother     Obesity Sister     No Known Problems Sister     Heart Disorder Maternal Grandfather     Dementia Maternal Grandmother     Asthma Son     Breast Cancer Neg     Ovarian Cancer Neg     Colon Cancer Neg      Social History     Socioeconomic History    Marital status:    Tobacco Use    Smoking status: Former     Current packs/day: 0.00     Average packs/day: 0.5 packs/day for 10.4 years (5.2 ttl pk-yrs)     Types: Cigarettes     Start date: 1995     Quit date: 2005     Years since quittin.7    Smokeless tobacco: Never   Vaping Use    Vaping status: Never Used   Substance and Sexual Activity    Alcohol use: No    Drug use: No    Sexual activity: Yes     Partners: Male     Comment: [facility]   Other Topics Concern    Caffeine Concern No    Exercise No    Seat Belt Yes    Special Diet No    Stress Concern No    Weight Concern No        Current Outpatient Medications:     NON FORMULARY, Diltiazem Hcl 2% Lidocaine 3% Ointment, Disp: , Rfl:     multiple vitamin Oral Chew Tab, Chew 1 tablet by mouth daily., Disp: , Rfl:      Review of Systems  The Review of Systems has been reviewed by me during today.  Review of Systems   Constitutional: Negative.    Respiratory: Negative.     Cardiovascular: Negative.    Gastrointestinal: Negative.    Skin: Negative.    Neurological: Negative.         Physical Findings   /76 (BP Location: Left arm, Patient Position: Sitting, Cuff Size: adult)   Pulse 75   Temp 97.6 °F (36.4 °C) (Temporal)   Resp 16   LMP 2025   SpO2 99%   Physical Exam  Vitals and nursing note reviewed.   Constitutional:       Appearance: She is well-developed.   HENT:      Head: Normocephalic and atraumatic.   Eyes:      General: No scleral icterus.  Neck:      Trachea: No tracheal deviation.   Genitourinary:     Rectum: No mass, tenderness, anal fissure, external hemorrhoid or internal hemorrhoid. Normal anal tone.          Comments: No  Rectocele  No Rectovaginal Fistula  No Episiotomy  Anal Sphincter Intact  No Pruritis Ani  No Lichenification  No Abscess  No Fistula in ano    (+) Anterior Fissure  No Posterior Fissure      Skin:     General: Skin is warm and dry.   Neurological:      Mental Status: She is alert and oriented to person, place, and time.   Psychiatric:         Behavior: Behavior normal. Behavior is cooperative.         Thought Content: Thought content normal.         Judgment: Judgment normal.          Assessment   1. Anal fissure        Plan     Patient was experiencing no further symptoms following rubber band ligation of internal hemorrhoids.    Patient's anal fissure improved after 4 days of topical nifedipine but she discontinued and has continued ongoing symptoms of pain especially with bowel movement.    Fissure is present in the anterior midline.    Will continue with topical nifedipine for 2 weeks daily treatment without interruption; patient instructed.    The patient will return for follow-up and continued assessment in 2 weeks.  If she continues to improve then nifedipine treatment will also continue.  If, however, the patient fails to improve then surgical intervention may be required.    The patient was provided ample opportunity to ask questions.  All of the patient's questions were answered in detail.  The patient voiced understanding of the care plan.     No orders of the defined types were placed in this encounter.      Imaging & Referrals   None    Follow Up  No follow-ups on file.    Mateo Walton MD

## 2025-03-26 ENCOUNTER — OFFICE VISIT (OUTPATIENT)
Dept: SURGERY | Facility: CLINIC | Age: 45
End: 2025-03-26
Payer: COMMERCIAL

## 2025-03-26 VITALS
HEART RATE: 69 BPM | RESPIRATION RATE: 18 BRPM | TEMPERATURE: 97 F | SYSTOLIC BLOOD PRESSURE: 115 MMHG | DIASTOLIC BLOOD PRESSURE: 70 MMHG | OXYGEN SATURATION: 100 %

## 2025-03-26 DIAGNOSIS — K60.2 ANAL FISSURE: Primary | ICD-10-CM

## 2025-03-26 PROCEDURE — 99212 OFFICE O/P EST SF 10 MIN: CPT | Performed by: SURGERY

## 2025-03-26 PROCEDURE — 3078F DIAST BP <80 MM HG: CPT | Performed by: SURGERY

## 2025-03-26 PROCEDURE — 3074F SYST BP LT 130 MM HG: CPT | Performed by: SURGERY

## 2025-03-26 NOTE — PROGRESS NOTES
Follow Up Visit Note       Active Problems      1. Anal fissure          Chief Complaint   Chief Complaint   Patient presents with    [facility]     EP- 2 weeks f/u Anal Fissure, Possible Hemorrhoid Banding- denies rectal pain and bleeding          History of Present Illness    Patient presents for continued follow-up of anal fissure.  Patient reports no further pain or discomfort.  She has been using topical nifedipine as directed with excellent results.  No new complaints offered.    The patient denies fever, chills, chest pain, shortness of breath, dyspnea. The patient also denies hematemesis, melena, or hematochezia. The patient denies change in bowel or bladder habits. There is no complaint of hematuria or dysuria.    Allergies  Sasha has No Known Allergies.    Past Medical / Surgical / Social / Family History    The past medical and past surgical history have been reviewed by me today.    Past Medical History:    Calculus of kidney    Histoplasmosis, unspecified    lung    KIDNEY STONE    Kidney stones    Visual impairment    glasses    Wrist fracture, right    about age 13     Past Surgical History:   Procedure Laterality Date          Colposcopy, cervix w/upper adjacent vagina; w/biopsy(s), cervix      benign    D & c      EAB      2014    Other surgical history  2017    Essure       The family history and social history have been reviewed by me today.    Family History   Problem Relation Age of Onset    Heart Disorder Father         cardiac arrythnmia    Cancer Father 71        Pancreatic Cancer    Lipids Mother     Hypertension Mother     Heart Disorder Mother         Arrythmia/pacemaker    Heart Disease Mother     Obesity Sister     No Known Problems Sister     Heart Disorder Maternal Grandfather     Dementia Maternal Grandmother     Asthma Son     Breast Cancer Neg     Ovarian Cancer Neg     Colon Cancer Neg      Social History     Socioeconomic History    Marital status:     Tobacco Use    Smoking status: Former     Current packs/day: 0.00     Average packs/day: 0.5 packs/day for 10.4 years (5.2 ttl pk-yrs)     Types: Cigarettes     Start date: 1995     Quit date: 2005     Years since quittin.8    Smokeless tobacco: Never   Vaping Use    Vaping status: Never Used   Substance and Sexual Activity    Alcohol use: No    Drug use: No    Sexual activity: Yes     Partners: Male     Comment: [facility]   Other Topics Concern    Caffeine Concern No    Exercise No    Seat Belt Yes    Special Diet No    Stress Concern No    Weight Concern No        Current Outpatient Medications:     NON FORMULARY, Diltiazem Hcl 2% Lidocaine 3% Ointment, Disp: , Rfl:     multiple vitamin Oral Chew Tab, Chew 1 tablet by mouth daily., Disp: , Rfl:      Review of Systems  The Review of Systems has been reviewed by me during today.  Review of Systems   Constitutional:  Negative for chills, diaphoresis, fatigue, fever and unexpected weight change.   HENT:  Negative for hearing loss, nosebleeds, sore throat and trouble swallowing.    Respiratory:  Negative for apnea, cough, shortness of breath and wheezing.    Cardiovascular:  Negative for chest pain, palpitations and leg swelling.   Gastrointestinal:  Negative for abdominal distention, abdominal pain, anal bleeding, blood in stool, constipation, diarrhea, nausea and vomiting.   Genitourinary:  Negative for difficulty urinating, dysuria, frequency and urgency.   Musculoskeletal:  Negative for arthralgias and myalgias.   Skin:  Negative for color change and rash.   Neurological:  Negative for tremors, syncope and weakness.   Hematological:  Negative for adenopathy. Does not bruise/bleed easily.   Psychiatric/Behavioral:  Negative for behavioral problems and sleep disturbance.         Physical Findings   /70   Pulse 69   Temp 97.4 °F (36.3 °C) (Temporal)   Resp 18   LMP 2025   SpO2 100%   Physical Exam  Vitals and nursing note  reviewed.   Constitutional:       Appearance: She is well-developed.   HENT:      Head: Normocephalic and atraumatic.   Eyes:      General: No scleral icterus.  Neck:      Trachea: No tracheal deviation.   Genitourinary:     Rectum: No mass, tenderness, anal fissure, external hemorrhoid or internal hemorrhoid. Normal anal tone.          Comments: No Rectocele  No Rectovaginal Fistula  No Episiotomy  Anal Sphincter Intact  No Pruritis Ani  No Lichenification  No Abscess  No Fistula in ano  No Anterior Fissure  No Posterior Fissure    Skin:     General: Skin is warm and dry.   Neurological:      Mental Status: She is alert and oriented to person, place, and time.   Psychiatric:         Behavior: Behavior normal. Behavior is cooperative.         Thought Content: Thought content normal.         Judgment: Judgment normal.          Assessment   1. Anal fissure        Plan     Patient's fissure has healed and is now asymptomatic.    Discontinue topical nifedipine.    Local care discussed and written and oral form.    Dietary, activity, exercise and lifestyle recommendations discussed.    Follow-up as needed.    The patient was provided ample opportunity to ask questions.  All of the patient's questions were answered in detail.  The patient voiced understanding of the care plan.     No orders of the defined types were placed in this encounter.      Imaging & Referrals   None    Follow Up  No follow-ups on file.    Mateo Walton MD

## 2025-06-10 ENCOUNTER — OFFICE VISIT (OUTPATIENT)
Facility: LOCATION | Age: 45
End: 2025-06-10
Payer: COMMERCIAL

## 2025-06-10 VITALS
OXYGEN SATURATION: 99 % | SYSTOLIC BLOOD PRESSURE: 108 MMHG | HEART RATE: 71 BPM | TEMPERATURE: 97 F | DIASTOLIC BLOOD PRESSURE: 73 MMHG

## 2025-06-10 DIAGNOSIS — K60.2 ANAL FISSURE: Primary | ICD-10-CM

## 2025-06-10 PROCEDURE — 3078F DIAST BP <80 MM HG: CPT | Performed by: SURGERY

## 2025-06-10 PROCEDURE — 3074F SYST BP LT 130 MM HG: CPT | Performed by: SURGERY

## 2025-06-10 PROCEDURE — 99213 OFFICE O/P EST LOW 20 MIN: CPT | Performed by: SURGERY

## 2025-06-10 NOTE — PROGRESS NOTES
Follow Up Visit Note       Active Problems      1. Anal fissure          Chief Complaint   Chief Complaint   Patient presents with    Follow - Up     EP - Recurrent anal fissure, Discuss other treatments, Using beside ointment. Medication not working and is out of medication. Had issues twice since April. Fiber gummies and metamucil. Lumps at the anal area. Not sure if scar tissue.          History of Present Illness    The patient presents for evaluation of anal fissure recurrence.  I have previously treated the patient nonoperatively with topical nifedipine and she experienced very good results initially.  Since our last encounter, the patient states that she has been experiencing stressors in her life and she also now experiences recurrence of similar fissure pain that she had previously.  The patient describes the pain as shards of glass when she has bowel function.  She also feels prominent tissue in the anterior midline of the anus.  No other complaints offered.    I discussed treatment options with the patient including continued topical treatments versus sphincterotomy.  The patient wants to explore nonoperative options as well and I recommend that she speak with Dr. Nava to discuss potential benefit of Botox treatment.    Allergies  Sasha has no known allergies.    Past Medical / Surgical / Social / Family History    The past medical and past surgical history have been reviewed by me today.    Past Medical History[1]  Past Surgical History[2]    The family history and social history have been reviewed by me today.    Family History[3]  Social Hx on file[4]   Medications - Current[5]     Review of Systems  The Review of Systems has been reviewed by me during today.  Review of Systems   Constitutional: Negative.    HENT: Negative.     Eyes: Negative.    Respiratory: Negative.     Cardiovascular: Negative.    Gastrointestinal: Negative.    Genitourinary: Negative.    Musculoskeletal: Negative.    Skin:  Negative.    Neurological: Negative.    Psychiatric/Behavioral: Negative.          Physical Findings   /73 (BP Location: Left arm, Patient Position: Sitting, Cuff Size: adult)   Pulse 71   Temp 97.3 °F (36.3 °C) (Temporal)   LMP 02/07/2025   SpO2 99%   Physical Exam  Vitals and nursing note reviewed. Exam conducted with a chaperone present.   Constitutional:       General: She is not in acute distress.     Appearance: She is well-developed.   HENT:      Head: Normocephalic and atraumatic.   Eyes:      General: No scleral icterus.  Neck:      Trachea: No tracheal deviation.   Cardiovascular:      Rate and Rhythm: Normal rate and regular rhythm.      Heart sounds: S1 normal and S2 normal. No murmur heard.  Pulmonary:      Effort: No tachypnea, accessory muscle usage or respiratory distress.      Breath sounds: No decreased breath sounds, wheezing, rhonchi or rales.   Genitourinary:     Exam position: Prone.      Rectum: No mass, tenderness, anal fissure, external hemorrhoid or internal hemorrhoid. Normal anal tone.          Comments: No Rectocele  No Rectovaginal Fistula  Anal Sphincter Intact  No Pruritis Ani  No Lichenification  No Abscess  No Fistula in ano    (+) Anterior Fissure    No Posterior Fissure    Skin:     General: Skin is warm and dry.   Neurological:      Mental Status: She is alert and oriented to person, place, and time.   Psychiatric:         Speech: Speech normal.         Behavior: Behavior normal. Behavior is cooperative.         Thought Content: Thought content normal.         Judgment: Judgment normal.          Assessment   1. 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    We discussed treatment options including operative versus nonoperative management.    The patient wishes to consider all nonoperative options, therefore, I have referred her to Dr. Nava to discuss potential benefit of Botox  treatments.    Local care was discussed and reinforced.    Dietary, activity, exercise and lifestyle recommendations discussed.    The patient will return to my attention on an as needed basis.    The patient was provided ample opportunity to ask questions.  All of the patient's questions were answered in detail.  The patient voiced understanding of the care plan.     No orders of the defined types were placed in this encounter.      Imaging & Referrals   None    Follow Up  No follow-ups on file.    Mateo Walton MD           [1]   Past Medical History:   Calculus of kidney    Decorative tattoo    Histoplasmosis, unspecified    lung    KIDNEY STONE    Kidney stones    Visual impairment    glasses    Wrist fracture, right    about age 13   [2]   Past Surgical History:  Procedure Laterality Date          Colposcopy, cervix w/upper adjacent vagina; w/biopsy(s), cervix      benign    D & c      EAB    Hemorrhoidectomy      Rubber Band Ligation          Other surgical history  2017    Essure   [3]   Family History  Problem Relation Age of Onset    Heart Disorder Father         cardiac arrythnmia    Cancer Father 71        Pancreatic Cancer    Pancreatic Cancer Father     Lipids Mother     Hypertension Mother     Heart Disorder Mother         Arrythmia/pacemaker    Heart Disease Mother     Obesity Sister     No Known Problems Sister     Heart Disorder Maternal Grandfather     Dementia Maternal Grandmother     Asthma Son     Breast Cancer Neg     Ovarian Cancer Neg     Colon Cancer Neg    [4]   Social History  Socioeconomic History    Marital status:    Tobacco Use    Smoking status: Former     Current packs/day: 0.00     Average packs/day: 0.5 packs/day for 10.4 years (5.2 ttl pk-yrs)     Types: Cigarettes     Start date: 1995     Quit date: 2005     Years since quittin.0    Smokeless tobacco: Never   Vaping Use    Vaping status: Never Used   Substance and Sexual  Activity    Alcohol use: No    Drug use: No    Sexual activity: Yes     Partners: Male     Comment: [facility]   Other Topics Concern    Caffeine Concern No    Exercise No    Seat Belt Yes    Special Diet No    Stress Concern No    Weight Concern No   [5]   Current Outpatient Medications:     NON FORMULARY, Diltiazem Hcl 2% Lidocaine 3% Ointment, Disp: , Rfl:     multiple vitamin Oral Chew Tab, Chew 1 tablet by mouth in the morning., Disp: , Rfl:

## 2025-08-18 ENCOUNTER — OFFICE VISIT (OUTPATIENT)
Facility: LOCATION | Age: 45
End: 2025-08-18

## 2025-08-18 VITALS
SYSTOLIC BLOOD PRESSURE: 102 MMHG | HEART RATE: 70 BPM | DIASTOLIC BLOOD PRESSURE: 70 MMHG | OXYGEN SATURATION: 99 % | TEMPERATURE: 97 F

## 2025-08-18 DIAGNOSIS — K60.2 ANAL FISSURE: Primary | ICD-10-CM

## 2025-08-18 DIAGNOSIS — K64.1 GRADE II INTERNAL HEMORRHOIDS: ICD-10-CM

## 2025-08-18 RX ORDER — HYDROCORTISONE 25 MG/G
1 CREAM TOPICAL DAILY PRN
COMMUNITY
Start: 2025-08-13

## 2025-08-20 ENCOUNTER — OFFICE VISIT (OUTPATIENT)
Dept: SURGERY | Facility: CLINIC | Age: 45
End: 2025-08-20

## 2025-08-20 VITALS
RESPIRATION RATE: 18 BRPM | SYSTOLIC BLOOD PRESSURE: 118 MMHG | HEART RATE: 79 BPM | DIASTOLIC BLOOD PRESSURE: 70 MMHG | OXYGEN SATURATION: 99 % | TEMPERATURE: 98 F

## 2025-08-20 DIAGNOSIS — Z12.11 ENCOUNTER FOR SCREENING COLONOSCOPY: Primary | ICD-10-CM

## 2025-08-20 RX ORDER — SOD SULF/POT CHLORIDE/MAG SULF 1.479 G
1 TABLET ORAL AS DIRECTED
Qty: 24 TABLET | Refills: 0 | Status: SHIPPED | OUTPATIENT
Start: 2025-08-20 | End: 2025-08-21

## (undated) DEVICE — FLUIDGARD® 160 ANTI-FOG SURGICAL MASK WITH ANTI-GLARE SHIELD: Brand: PRECEPT ®

## (undated) DEVICE — FILTERLINE NASAL ADULT O2/CO2

## (undated) DEVICE — Device

## (undated) DEVICE — MEDI-VAC NON-CONDUCTIVE SUCTION TUBING: Brand: CARDINAL HEALTH

## (undated) DEVICE — BOWLS UTILITY 16OZ

## (undated) DEVICE — 60 ML SYRINGE REGULAR TIP: Brand: MONOJECT

## (undated) DEVICE — GLOVE SURG SENSICARE SZ 7

## (undated) DEVICE — INTERCEED

## (undated) DEVICE — NEEDLE ASP VIZISHOT 22GA 1.8MM

## (undated) DEVICE — 1200CC GUARDIAN II: Brand: GUARDIAN

## (undated) DEVICE — SINGLE USE BIOPSY VALVE MAJ-210: Brand: SINGLE USE BIOPSY VALVE (STERILE)

## (undated) DEVICE — ENDOSCOPY PACK UPPER: Brand: MEDLINE INDUSTRIES, INC.

## (undated) DEVICE — MEDI-VAC SUCTION HANDLE REGULAR CAPACITY: Brand: CARDINAL HEALTH

## (undated) DEVICE — INSORB SKIN STAPLER

## (undated) DEVICE — SYRINGE 10ML SLIP TIP

## (undated) DEVICE — LENS FRAMES DISP EYEWEAR

## (undated) DEVICE — MASK ISOLATION

## (undated) DEVICE — KENDALL SCD EXPRESS SLEEVES, KNEE LENGTH, MEDIUM: Brand: KENDALL SCD

## (undated) DEVICE — LENS PLASTIC DISP EYEWEAR

## (undated) DEVICE — 3M™ RED DOT™ MONITORING ELECTRODE WITH FOAM TAPE AND STICKY GEL, 50/BAG, 20/CASE, 72/PLT 2570: Brand: RED DOT™

## (undated) DEVICE — CONMED MULTI-PURPOSE SHEATHED CYTOLOGY BRUSH, RING HANDLE, 1.7 MM X 160 CM: Brand: CONMED

## (undated) DEVICE — SPECIMEN TRAP LUKI

## (undated) DEVICE — FORCEPS BX 100CM 1.8MM RJ STD

## (undated) DEVICE — SINGLE USE SUCTION VALVE MAJ-209: Brand: SINGLE USE SUCTION VALVE (STERILE)

## (undated) NOTE — LETTER
24    Patient: Sasha Thurston  : 1980 Visit date: 12/3/2024    Dear  Carlos Murphy MD    Thank you for referring Sasha Thurston to my practice.  Please find my assessment and plan below.     Assessment   1. Internal hemorrhoids with complication    2. Hematochezia          Plan     The patient will be scheduled for rubber band ligation and phenol injection of internal hemorrhoids as an outpatient.    Dietary fiber supplementation as well as activity exercise and lifestyle modification was discussed.    The zurdo-operative care plan was discussed with the patient, who voices understanding.  Activity and lifting recommendations were discussed in length.     The risks, benefits, and alternatives to the procedure were explained to the patient.  The risks explained include, but are not limited to, bleeding, infection, pain wound complications, recurrence, incorrect diagnosis, injury to adjacent organs and structures. We also discussed the possibile need for further therapeutic, diagnostic, or surgical intervention.  The patient voiced understanding, and after all questions were answered to the patient's satisfaction, the patient provided willing and informed consent to proceed.      Sincerely,       Mateo Walton MD   CC: No Recipients

## (undated) NOTE — LETTER
Date & Time: 4/10/2023, 5:17 PM  Patient: Larry Thurston  Encounter Provider(s):    MD Matilda [place]       To Whom It May Concern:    Jennifer Rowland was seen and treated in our department on 4/10/2023. She has a kidney stone and can return to work if her pain is controlled.      If you have any questions or concerns, please do not hesitate to call.        _____________________________  Physician/APC Signature

## (undated) NOTE — MR AVS SNAPSHOT
[address]  [address]  [address] [id number]  [phone]               Thank you for choosing us for your health care visit with Nhung Lynn PA-C.   We are glad to serve you and happy to provide you with this summar Current Medications          This list is accurate as of: 3/1/17  1:33 PM.  Always use your most recent med list.                prenatal multivitamin plus DHA 27-0.8-228 MG Caps   Take 1 capsule by mouth daily.            Tobramycin Sulfate 0.3 % Soln   Pl

## (undated) NOTE — LETTER
[facility]  [id number] [facility], [address] [place] [address]    Consent for Operation    Date: __________________    Time: _______________    1.  I authorize the performance upon Marisela Decker the following operation:                              Pr videotape. [facility] will not be responsible for storage or maintenance of this tape. 6. For the purpose of advancing medical education, I consent to the admittance of observers to the Operating Room.     7. I authorize the use of any specimen, organs Signature of Patient:   ___________________________    When the patient is a minor or mentally incompetent to give consent:  Signature of person authorized to consent for patient: ___________________________   Relationship to patient: _____________________ · If I am allergic to anything or have had a reaction to anesthesia before. 3. I understand how the anesthesia medicine will help me (benefits). 4. I understand that with any type of anesthesia medicine there are risks:  a.  The most common risks are: their representative has agreed to have anesthesia services.     _____________________________________________________________________________  Witness        Date   Time  I have verified that the signature is that of the patient or patient’s representative

## (undated) NOTE — LETTER
06/10/25    Patient: Sasha Thurston  : 1980 Visit date: 6/10/2025    Dear  Carlos Murphy MD    Thank you for referring Sasha Thurston to my practice.  Please find my assessment and plan below.    Assessment   1. 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    We discussed treatment options including operative versus nonoperative management.    The patient wishes to consider all nonoperative options, therefore, I have referred her to Dr. Nava to discuss potential benefit of Botox treatments.    Local care was discussed and reinforced.    Dietary, activity, exercise and lifestyle recommendations discussed.    The patient will return to my attention on an as needed basis.    The patient was provided ample opportunity to ask questions.  All of the patient's questions were answered in detail.  The patient voiced understanding of the care plan.       Sincerely,       Mateo Walton MD   CC: No Recipients

## (undated) NOTE — LETTER
Date: 8/24/2021    Patient Name: Shahnaz Rivas          To Whom it may concern: The above patient was seen at the [facility] for treatment of a medical condition.     This patient should be excused from attending work from 8/23/202

## (undated) NOTE — LETTER
12/22/19        Rhonda Brannon  [address] [place] [address], [place] [address]      Dear Tracy Fam,    [facility] records indicate that you have outstanding lab work and or testing that was ordered for you and has not yet been completed:  Orders Placed This Encounter

## (undated) NOTE — LETTER
25    Patient: Sasha Tuhrston  : 1980 Visit date: 2025    Dear  Carlos Murphy MD    Thank you for referring Sasha Thurston to my practice.  Please find my assessment and plan below.    Assessment   1. Internal hemorrhoids with complication        Plan     Successful treatment of internal hemorrhoid of the right posterior lateral position by rubber band ligation and phenol injection.    The care plan is discussed with the patient who voiced understanding.    Dietary, activity, and exercise recommendations were discussed with the patient.    The patient is encouraged to avoid straining, continue dietary fiber supplementation, stool softeners, and adequate hydration.    Follow-up in 2 weeks for continued treatment of internal hemorrhoids.    The patient was provided ample opportunity to ask questions.  All of the patient's questions were answered in detail.  The patient voiced understanding of the care plan.         Sincerely,       Mateo Walton MD   CC: No Recipients

## (undated) NOTE — LETTER
Patient Name: Sasha Thurston        : 1980       Medical Record #: [id number]    CONSENT FOR PROCEDURES/SEDATION    Date: 2025       Time: 9:30 AM        1. I authorize the performance upon Sasha Thurston the following:    __________________________________________________________________________    2. I authorize Dr. Walton (and whomever is designated as the doctor’s assistant), to perform the above mentioned procedures.    3. If any unforeseen conditions arise during this procedure calling for additional procedures, operations, or medications (including anesthesia and blood transfusion), I  further request and authorize the doctor to do whatever he/she deems advisable in my interest.    4. I consent to the taking and reproduction of any photographs in the course of this procedure for professional purposes.    5. I consent to the administration of such sedation as may be considered necessary or advisable by the physician responsible for this service, with the exception of  _____________________________.    6. I have been informed by my doctor of the nature and purpose of this procedure/sedation, possible alternative methods of treatment, risk involved and possible complications.      Signature of Patient:  ___________________________    Signature of person authorized to consent for patient: Relationship to patient:  ___________________________    ___________________    Witness: ____________________     Date: ______________    Provider: ____________________     Date: ______________

## (undated) NOTE — LETTER
25    Patient: Sasha Thurston  : 1980 Visit date: 3/26/2025    Dear  Carlos Murphy MD    Thank you for referring Sasha Thurston to my practice.  Please find my assessment and plan below.    Assessment   1. Anal fissure        Plan     Patient's fissure has healed and is now asymptomatic.    Discontinue topical nifedipine.    Local care discussed and written and oral form.    Dietary, activity, exercise and lifestyle recommendations discussed.    Follow-up as needed.    The patient was provided ample opportunity to ask questions.  All of the patient's questions were answered in detail.  The patient voiced understanding of the care plan.       Sincerely,       Mateo Walton MD   CC: No Recipients

## (undated) NOTE — MR AVS SNAPSHOT
After Visit Summary   2017    Ethel Austin    MRN: [id number]           Visit Information        Provider Department Dept Phone    2017 11:00 AM  [facility]  Outpt [phone]      Your Vitals Were     BP Pulse Ht Wt BMI LMP active are less likely to develop some chronic diseases than adults who are inactive.      HOW TO GET STARTED: HOW TO STAY MOTIVATED:   Start activities slowly and build up over time Do what you like   Get your heart pumping – brisk walking, biking, swimmin

## (undated) NOTE — LETTER
25    Patient: Sasha Thurston  : 1980 Visit date: 3/6/2025    Dear  Carlos Murphy MD    Thank you for referring Sasha Thurston to my practice.  Please find my assessment and plan below.    Assessment   1. Anal fissure        Plan     Patient was experiencing no further symptoms following rubber band ligation of internal hemorrhoids.    Patient's anal fissure improved after 4 days of topical nifedipine but she discontinued and has continued ongoing symptoms of pain especially with bowel movement.    Fissure is present in the anterior midline.    Will continue with topical nifedipine for 2 weeks daily treatment without interruption; patient instructed.    The patient will return for follow-up and continued assessment in 2 weeks.  If she continues to improve then nifedipine treatment will also continue.  If, however, the patient fails to improve then surgical intervention may be required.    The patient was provided ample opportunity to ask questions.  All of the patient's questions were answered in detail.  The patient voiced understanding of the care plan.       Sincerely,       Mateo Walton MD   CC: No Recipients

## (undated) NOTE — LETTER
12/13/18        Juan Thurston  [address] [place] [address], [place] [address]      Dear Rose Marie Bailey,    [facility] records indicate that you have outstanding lab work and or testing that was ordered for you and has not yet been completed:        CBC With Diff

## (undated) NOTE — LETTER
Patient Name: Sasha Thurston        : 1980       Medical Record #: [id number]    CONSENT FOR PROCEDURES/SEDATION    Date: 3/6/2025       Time: 8:41 AM        1. I authorize the performance upon Sasha Thurston the following:    __________________________________________________________________________    2. I authorize Dr. *** (and whomever is designated as the doctor’s assistant), to perform the above mentioned procedures.    3. If any unforeseen conditions arise during this procedure calling for additional procedures, operations, or medications (including anesthesia and blood transfusion), I  further request and authorize the doctor to do whatever he/she deems advisable in my interest.    4. I consent to the taking and reproduction of any photographs in the course of this procedure for professional purposes.    5. I consent to the administration of such sedation as may be considered necessary or advisable by the physician responsible for this service, with the exception of  _____________________________.    6. I have been informed by my doctor of the nature and purpose of this procedure/sedation, possible alternative methods of treatment, risk involved and possible complications.      Signature of Patient:  ___________________________    Signature of person authorized to consent for patient: Relationship to patient:  ___________________________    ___________________    Witness: ____________________     Date: ______________    Provider: ____________________     Date: ______________

## (undated) NOTE — IP AVS SNAPSHOT
[facility] [place] [address] [place], [address] ~ [phone]                Discharge Summary   5/29/2017    [facility]           Admission Information        Provider Department    5/29/2017 Florin Leger MD  1 OB Lab Results  (Last 3 results in the past 270 days)    Blood Type Rh Factor Rubella GBBS    (05/29/17)  O (05/29/17)  Negative -- --      Recent Hematology Lab Results  (Last 3 results in the past 90 days)    WBC RBC Hemoglobin Hematocrit [facility] RD Lactation Education-Mother Chart  Resolved   Breastfeeding / pumping journal Resolved   Breastfeeding suggestions for home Resolved   Engorgement / sore nipple prevention and management Resolved   Outpatient lactation clinic information Resolved   Storage Surgical procedure  Resolved   Safe surgery Resolved         Handwashing & Infection Prevention  Resolved   Handwashing and Respiratory Hygiene Resolved               Additional Information       Call your doctor if you have feelings of anxiety, sadness or Summaries. If you've been to the Emergency Department or your doctor's office, you can view your past visit information in [employer] by going to Visits < Visit Summaries. [employer] questions? Call [phone] for help.   [employer] is NOT to be used for urge

## (undated) NOTE — LETTER
25    Patient: Sasha Thurston  : 1980 Visit date: 2025    Dear  Carlos Murphy MD    Thank you for referring Sasha Thurston to my practice.  Please find my assessment and plan below.    Assessment   1. Anal fissure    2. Internal hemorrhoids with complication        Plan     Successful treatment of internal hemorrhoid by rubber band ligation and phenol injection.    The patient has a new anal fissure in the right anterolateral aspect of the anus.  Topical diltiazem prescribed.  2-week follow-up to assess response to treatment.    The care plan is discussed with the patient who voiced understanding.    Dietary, activity, and exercise recommendations were discussed with the patient.    The patient is encouraged to avoid straining, continue dietary fiber supplementation, stool softeners, and adequate hydration.    Follow-up in 2 weeks for continued treatment of internal hemorrhoids.    The patient was provided ample opportunity to ask questions.  All of the patient's questions were answered in detail.  The patient voiced understanding of the care plan.         Sincerely,       Mateo Walton MD   CC: No Recipients